# Patient Record
Sex: FEMALE | Race: WHITE | Employment: OTHER | URBAN - METROPOLITAN AREA
[De-identification: names, ages, dates, MRNs, and addresses within clinical notes are randomized per-mention and may not be internally consistent; named-entity substitution may affect disease eponyms.]

---

## 2017-01-16 DIAGNOSIS — R12 HEARTBURN: Primary | ICD-10-CM

## 2017-01-16 RX ORDER — NICOTINE POLACRILEX 4 MG/1
20 GUM, CHEWING ORAL DAILY
Qty: 90 TABLET | Refills: 1 | Status: SHIPPED | OUTPATIENT
Start: 2017-01-16 | End: 2017-07-23

## 2017-01-16 NOTE — TELEPHONE ENCOUNTER
Omeprazole      Last Written Prescription Date: 10/24/16  Last Fill Quantity: 90,  # refills: 0  Last Office Visit with G, P or [facility] prescribing provider:       12/23/16

## 2017-01-30 DIAGNOSIS — M25.552 HIP PAIN, LEFT: Primary | ICD-10-CM

## 2017-01-30 RX ORDER — MELOXICAM 7.5 MG/1
7.5 TABLET ORAL 2 TIMES DAILY
Qty: 60 TABLET | Refills: 0 | Status: SHIPPED | OUTPATIENT
Start: 2017-01-30 | End: 2017-03-23

## 2017-01-30 NOTE — TELEPHONE ENCOUNTER
Meloxicam      Last Written Prescription Date: 125/2016  Last Quantity: 60, # refills: 0  Last Office Visit with [facility], [facility] or [facility] prescribing provider: 12/23/2016       CREATININE   Date Value Ref Range Status   01/13/2016 0.72 0.52 - 1.04 mg/dL Final     AST       26   5/29/2012  ALT       28   12/16/2016  BP Readings from Last 3 Encounters:   12/23/16 132/80   11/08/16 138/82   07/15/16 120/72

## 2017-03-23 DIAGNOSIS — M25.552 HIP PAIN, LEFT: ICD-10-CM

## 2017-03-23 NOTE — TELEPHONE ENCOUNTER
meloxicum     Last Written Prescription Date: 01/30/2017  Last Quantity: 60, # refills: 0  Last Office Visit with [facility], [facility] or [facility] prescribing provider: 12/23/2016       Creatinine   Date Value Ref Range Status   01/13/2016 0.72 0.52 - 1.04 mg/dL Final     Lab Results   Component Value Date    AST 26 05/29/2012     Lab Results   Component Value Date    ALT 28 12/16/2016     BP Readings from Last 3 Encounters:   12/23/16 132/80   11/08/16 138/82   07/15/16 120/72

## 2017-03-24 DIAGNOSIS — E03.9 HYPOTHYROIDISM, UNSPECIFIED TYPE: ICD-10-CM

## 2017-03-24 DIAGNOSIS — Z11.59 NEED FOR HEPATITIS C SCREENING TEST: ICD-10-CM

## 2017-03-24 LAB
HCV AB SERPL QL IA: NORMAL
TSH SERPL DL<=0.05 MIU/L-ACNC: 2.34 MU/L (ref 0.4–4)

## 2017-03-24 PROCEDURE — 36415 COLL VENOUS BLD VENIPUNCTURE: CPT | Performed by: FAMILY MEDICINE

## 2017-03-24 PROCEDURE — 86803 HEPATITIS C AB TEST: CPT | Performed by: FAMILY MEDICINE

## 2017-03-24 PROCEDURE — 84443 ASSAY THYROID STIM HORMONE: CPT | Performed by: FAMILY MEDICINE

## 2017-03-24 RX ORDER — MELOXICAM 7.5 MG/1
7.5 TABLET ORAL 2 TIMES DAILY
Qty: 60 TABLET | Refills: 0 | Status: SHIPPED | OUTPATIENT
Start: 2017-03-24 | End: 2017-04-28

## 2017-03-24 RX ORDER — LEVOTHYROXINE SODIUM 150 UG/1
150 TABLET ORAL DAILY
Qty: 90 TABLET | Refills: 0 | Status: SHIPPED | OUTPATIENT
Start: 2017-03-24 | End: 2017-06-23

## 2017-04-03 NOTE — PROGRESS NOTES
Please call.  Hepatitis C test is negative.    TSH is normal indicating that thyroid function is normal.    PLAN: No change in current levothyroxine dose.

## 2017-04-28 DIAGNOSIS — M25.552 HIP PAIN, LEFT: ICD-10-CM

## 2017-04-28 RX ORDER — MELOXICAM 7.5 MG/1
TABLET ORAL
Qty: 60 TABLET | Refills: 0 | Status: SHIPPED | OUTPATIENT
Start: 2017-04-28 | End: 2017-07-16

## 2017-04-28 NOTE — TELEPHONE ENCOUNTER
Meloxicam 7.5      Last Written Prescription Date: 3/24/17  Last Quantity: 60, # refills: 0  Last [facility] Visit with [facility] or [facility] prescribing provider: 12/23/16       Creatinine   Date Value Ref Range Status   01/13/2016 0.72 0.52 - 1.04 mg/dL Final     Lab Results   Component Value Date    AST 26 05/29/2012     Lab Results   Component Value Date    ALT 28 12/16/2016     BP Readings from Last 3 Encounters:   12/23/16 132/80   11/08/16 138/82   07/15/16 120/72

## 2017-06-23 DIAGNOSIS — E03.9 HYPOTHYROIDISM, UNSPECIFIED TYPE: ICD-10-CM

## 2017-06-23 RX ORDER — LEVOTHYROXINE SODIUM 150 UG/1
TABLET ORAL
Qty: 90 TABLET | Refills: 2 | Status: SHIPPED | OUTPATIENT
Start: 2017-06-23 | End: 2018-03-21

## 2017-06-23 NOTE — TELEPHONE ENCOUNTER
Levothyroxine 150 mcg     Last Written Prescription Date: 3/24/17  Last Quantity: 90, # refills: 0  Last Office Visit with G, P or [facility] prescribing provider: 12/23/16        TSH   Date Value Ref Range Status   03/24/2017 2.34 0.40 - 4.00 mU/L Final

## 2017-07-16 DIAGNOSIS — M25.552 HIP PAIN, LEFT: ICD-10-CM

## 2017-07-17 RX ORDER — MELOXICAM 7.5 MG/1
TABLET ORAL
Qty: 60 TABLET | Refills: 0 | Status: SHIPPED | OUTPATIENT
Start: 2017-07-17 | End: 2017-07-25

## 2017-07-17 NOTE — TELEPHONE ENCOUNTER
Meloxicam      Last Written Prescription Date: 04/28/17  Last Quantity: 60, # refills: 0  Last Office Visit with [facility], [facility] or [facility] prescribing provider: 12/23/16       Creatinine   Date Value Ref Range Status   01/13/2016 0.72 0.52 - 1.04 mg/dL Final     Lab Results   Component Value Date    AST 26 05/29/2012     Lab Results   Component Value Date    ALT 28 12/16/2016     BP Readings from Last 3 Encounters:   12/23/16 132/80   11/08/16 138/82   07/15/16 120/72

## 2017-07-21 ENCOUNTER — TELEPHONE (OUTPATIENT)
Dept: FAMILY MEDICINE | Facility: CLINIC | Age: 67
End: 2017-07-21

## 2017-07-21 DIAGNOSIS — M25.552 HIP PAIN, LEFT: ICD-10-CM

## 2017-07-21 NOTE — TELEPHONE ENCOUNTER
Audelia is calling to see if Dr. Day will write Maloxicam 7.5 mg from # 60 to #30 ?   This is do to insurance coverage. Please call on her [phone].  [facility]

## 2017-07-21 NOTE — TELEPHONE ENCOUNTER
I talked with Audelia and she will check with the pharmacy about this medication, the dosing and amount ordered  Lorin Phelan RN

## 2017-07-23 DIAGNOSIS — R12 HEARTBURN: ICD-10-CM

## 2017-07-24 RX ORDER — NICOTINE POLACRILEX 4 MG/1
GUM, CHEWING ORAL
Qty: 90 TABLET | Refills: 0 | Status: SHIPPED | OUTPATIENT
Start: 2017-07-24 | End: 2017-11-03

## 2017-07-24 NOTE — TELEPHONE ENCOUNTER
Omeprazole  20 mg    Last Written Prescription Date: 1/16/17  Last Fill Quantity: 90,  # refills: 1   Last Office Visit with G, [facility] or [facility] prescribing provider: 12/23/16

## 2017-07-25 DIAGNOSIS — M25.552 HIP PAIN, LEFT: ICD-10-CM

## 2017-07-25 RX ORDER — MELOXICAM 7.5 MG/1
7.5 TABLET ORAL 2 TIMES DAILY
Qty: 60 TABLET | Refills: 0 | Status: SHIPPED | OUTPATIENT
Start: 2017-07-25 | End: 2017-11-10

## 2017-09-06 ENCOUNTER — TELEPHONE (OUTPATIENT)
Dept: FAMILY MEDICINE | Facility: CLINIC | Age: 67
End: 2017-09-06

## 2017-09-07 NOTE — TELEPHONE ENCOUNTER
Pharmacy requesting a prior auth on the meloxicam 7.5mg tabs 1BID qty 60 per 30 days.  [facility] wants them to use 1/2 of the 15mg tabs qty 30 per 30 days.  Pharmacy notified.

## 2017-11-03 DIAGNOSIS — R12 HEARTBURN: ICD-10-CM

## 2017-11-10 DIAGNOSIS — M25.552 HIP PAIN, LEFT: ICD-10-CM

## 2017-11-10 RX ORDER — MELOXICAM 15 MG/1
TABLET ORAL
Qty: 30 TABLET | Refills: 0 | Status: SHIPPED | OUTPATIENT
Start: 2017-11-10 | End: 2018-01-12

## 2017-11-21 ENCOUNTER — TELEPHONE (OUTPATIENT)
Dept: FAMILY MEDICINE | Facility: CLINIC | Age: 67
End: 2017-11-21

## 2017-12-07 ENCOUNTER — OFFICE VISIT (OUTPATIENT)
Dept: FAMILY MEDICINE | Facility: CLINIC | Age: 67
End: 2017-12-07
Payer: COMMERCIAL

## 2017-12-07 VITALS
RESPIRATION RATE: 14 BRPM | TEMPERATURE: 98.6 F | SYSTOLIC BLOOD PRESSURE: 136 MMHG | DIASTOLIC BLOOD PRESSURE: 86 MMHG | BODY MASS INDEX: 39.86 KG/M2 | HEIGHT: 66 IN | WEIGHT: 248 LBS | HEART RATE: 68 BPM

## 2017-12-07 DIAGNOSIS — Z23 NEED FOR PROPHYLACTIC VACCINATION AND INOCULATION AGAINST INFLUENZA: ICD-10-CM

## 2017-12-07 DIAGNOSIS — Z12.11 SPECIAL SCREENING FOR MALIGNANT NEOPLASMS, COLON: ICD-10-CM

## 2017-12-07 DIAGNOSIS — G57.10 MERALGIA PARESTHETICA, UNSPECIFIED LATERALITY: Primary | ICD-10-CM

## 2017-12-07 PROCEDURE — 90670 PCV13 VACCINE IM: CPT | Performed by: FAMILY MEDICINE

## 2017-12-07 PROCEDURE — 99213 OFFICE O/P EST LOW 20 MIN: CPT | Mod: 25 | Performed by: FAMILY MEDICINE

## 2017-12-07 PROCEDURE — G0008 ADMIN INFLUENZA VIRUS VAC: HCPCS | Performed by: FAMILY MEDICINE

## 2017-12-07 PROCEDURE — 90662 IIV NO PRSV INCREASED AG IM: CPT | Performed by: FAMILY MEDICINE

## 2017-12-07 PROCEDURE — G0009 ADMIN PNEUMOCOCCAL VACCINE: HCPCS | Performed by: FAMILY MEDICINE

## 2017-12-07 NOTE — NURSING NOTE
"Chief Complaint   Patient presents with     Musculoskeletal Problem       Initial /86  Pulse 68  Temp 98.6  F (37  C) (Tympanic)  Resp 14  Ht 5' 6\" (1.676 m)  Wt 248 lb (112.5 kg)  BMI 40.03 kg/m2 Estimated body mass index is 40.03 kg/(m^2) as calculated from the following:    Height as of this encounter: 5' 6\" (1.676 m).    Weight as of this encounter: 248 lb (112.5 kg).  Medication Reconciliation: complete    Health Maintenance that is potentially due pending provider review:          Is there anyone who you would like to be able to receive your results?   If yes have patient fill out LEANA    "

## 2017-12-07 NOTE — MR AVS SNAPSHOT
After Visit Summary   12/7/2017    Audelia Aceves    MRN: [id number]           Patient Information     Date Of Birth          1950        Visit Information        Provider Department      12/7/2017 2:40 PM Rajat Day MD [facility]        Today's Diagnoses     Meralgia paresthetica, unspecified laterality    -  1      Care Instructions    Your thigh discomfort is likely due to nerve pain. Weight loss and staying active can help. Wearing loose fitting pants and belts can also help reduce compression on this nerve.    If your pain does not improve or worsens, we can try a medication.    You received you flu shot and 1st dose of pneumococcal (pneumonia) vaccine today.    You can return to the pharmacy to receive your tetanus booster vaccine before your trip.    You are due for colon cancer screening and a mammogram.  - FIT test (stool sample) for colon cancer screening.            Follow-ups after your visit        Who to contact     If you have questions or need follow up information about today's clinic visit or your schedule please contact [facility] directly at [phone].  Normal or non-critical lab and imaging results will be communicated to you by [employer], letter or phone within 4 business days after the clinic has received the results. If you do not hear from us within 7 days, please contact the clinic through [employer] or phone. If you have a critical or abnormal lab result, we will notify you by phone as soon as possible.  Submit refill requests through [employer] or call your pharmacy and they will forward the refill request to us. Please allow 3 business days for your refill to be completed.          Additional Information About Your Visit        MyChart Information     [employer] lets you send messages to your doctor, view your test results, renew your prescriptions, schedule appointments and more. To sign up, go to [email or web address] .  "Click on \"Log in\" on the left side of the screen, which will take you to the Welcome page. Then click on \"Sign up Now\" on the right side of the page.     You will be asked to enter the access code listed below, as well as some personal information. Please follow the directions to create your username and password.     Your access code is: [email or web address]  Expires: 3/7/2018  3:42 PM     Your access code will  in 90 days. If you need help or a new code, please call your [place] clinic or [phone].        Care EveryWhere ID     This is your Care EveryWhere ID. This could be used by other organizations to access your [place] medical records  [id number]        Your Vitals Were     Pulse Temperature Respirations Height BMI (Body Mass Index)       68 98.6  F (37  C) (Tympanic) 14 5' 6\" (1.676 m) 40.03 kg/m2        Blood Pressure from Last 3 Encounters:   17 136/86   16 132/80   16 138/82    Weight from Last 3 Encounters:   17 248 lb (112.5 kg)   16 245 lb (111.1 kg)   16 244 lb (110.7 kg)              Today, you had the following     No orders found for display       Primary Care Provider Office Phone # Fax #    Rajat Day [phone] [address] [place], [place] [address]        Equal Access to Services     [facility] AH: Hadii praveen ku hadasho Soomaali, waaxda luqadaha, qaybta kaalmada adeegyada, roge edwards . So [facility] [phone].    ATENCIÓN: Si habla español, tiene a blancas disposición servicios gratuitos de asistencia lingüística. Llame al [phone].    We comply with applicable federal civil rights laws and [place] laws. We do not discriminate on the basis of race, color, national origin, age, disability, sex, sexual orientation, or gender identity.            Thank you!     Thank you for choosing [facility]  for your care. Our goal is always to provide you with excellent care. Hearing back from " our patients is one way we can continue to improve our services. Please take a few minutes to complete the written survey that you may receive in the mail after your visit with us. Thank you!             Your Updated Medication List - Protect others around you: Learn how to safely use, store and throw away your medicines at www.disposemymeds.org.          This list is accurate as of: 12/7/17  3:42 PM.  Always use your most recent med list.                   Brand Name Dispense Instructions for use Diagnosis    amLODIPine 10 MG tablet    NORVASC    90 tablet    Take 1 tablet (10 mg) by mouth daily    Hypertension goal BP (blood pressure) < 140/90       aspirin 81 MG tablet      Take by mouth daily        calcium 600-200 MG-UNIT Tabs     30 tablet    Take 1 tablet by mouth daily        Glucosamine HCl 1000 MG Tabs      Take 1 tablet by mouth 2 times daily.        levothyroxine 150 MCG tablet    SYNTHROID/LEVOTHROID    90 tablet    TAKE ONE TABLET BY MOUTH ONCE DAILY    Hypothyroidism, unspecified type       losartan 50 MG tablet    COZAAR    90 tablet    Take 1 tablet (50 mg) by mouth daily    Hypertension goal BP (blood pressure) < 140/90       lovastatin 40 MG tablet    MEVACOR    90 tablet    Take 1 tablet (40 mg) by mouth At Bedtime    Hyperlipidemia LDL goal <130       meloxicam 15 MG tablet    MOBIC    30 tablet    TAKE ONE-HALF TABLET BY MOUTH TWICE A DAY    Hip pain, left       nitroGLYcerin 0.4 MG sublingual tablet    NITROSTAT    25 tablet    Place 1 tablet (0.4 mg) under the tongue every 5 minutes as needed for chest pain    Chest pain, unspecified type       omeprazole 20 MG CR capsule    priLOSEC    90 capsule    TAKE ONE CAPSULE BY MOUTH ONCE DAILY 30-60 MINUTES BEFORE A MEAL    Heartburn       oxybutynin 5 MG tablet    DITROPAN    270 tablet    Take 1 tablet (5 mg) by mouth 3 times daily    Overactive bladder       vitamin D3 2000 UNITS Caps      Take 1 capsule by mouth

## 2017-12-07 NOTE — PATIENT INSTRUCTIONS
Your thigh discomfort is likely due to nerve pain. Weight loss and staying active can help. Wearing loose fitting pants and belts can also help reduce compression on this nerve.    If your pain does not improve or worsens, we can try a medication.    You received you flu shot and 1st dose of pneumococcal (pneumonia) vaccine today.    You can return to the pharmacy to receive your tetanus booster vaccine before your trip.    You are due for colon cancer screening and a mammogram.  - FIT test (stool sample) for colon cancer screening.

## 2017-12-07 NOTE — PROGRESS NOTES
SUBJECTIVE:   Audelia Aceves is a 67 year old female who presents to clinic today for the following health issues:    Thighs burning    Duration: months    Description (location/character/radiation): Top of thighs, burning,prickly and painful. Worse when standing.    Intensity:  mild, moderate    Accompanying signs and symptoms: Wakes her at night.    History (similar episodes/previous evaluation): None    Precipitating or alleviating factors: None    Therapies tried and outcome:      Ms. Aceves presents with a 2-month history of intermittent burning pain and numbness on anterior bilateral thighs. The pain occurs 3-4 times per day and lasts for about 10 minutes before spontaneously resolving. The pain is worse with standing for long periods of time and causes discomfort at night. She denies back pain, pain radiating to posterior thighs or groin area, no new skin rash, or urinary symptoms. Has not taken over the counter medications for this. No similar symptoms in the past or related trauma to the area.    Patient Active Problem List   Diagnosis     Hypertension goal BP (blood pressure) < 140/90     Obesity (BMI 30-39.9)     Hypothyroidism     Hyperlipidemia LDL goal <130     OA (osteoarthritis) of knee     Advanced directives, counseling/discussion     Chest pain      Problem list and histories reviewed & adjusted, as indicated.  Additional history: none  Labs reviewed in EPIC    Reviewed and updated as needed this visit by clinical staffAllergies       Reviewed and updated as needed this visit by Provider       ROS:  Constitutional: No fever, chills, weight loss, change in appetite, weakness  Respiratory: No shortness of breath  Cardiovascular: No chest pain, orthopnea  Gastrointestinal: No heartburn, abdominal pain  Genitourinary: No urinary urgency or change in frequency, incontinence, dysuria  Skin: No rashes, sores  Musculoskeletal: No arthralgias, deformity, swelling, myalgias, muscle  "weakness  Neurologic: See above. No headache, dizziness, focal weakness  Psychiatric: No changes in sleep, mood, interest, concentration    OBJECTIVE:     /86  Pulse 68  Temp 98.6  F (37  C) (Tympanic)  Resp 14  Ht 5' 6\" (1.676 m)  Wt 248 lb (112.5 kg)  BMI 40.03 kg/m2  Body mass index is 40.03 kg/(m^2).    Constitutional: obese, well appearing, in no acute distress  Head: normocephalic  Eyes: anicteric sclera  GI: soft, non-distended abdomen, non-tender to palpation, normoactive bowel sounds  : no hernia or inguinal lymphadenopathy  Skin: warm and dry, no rashes or erythema  Musculoskeletal: ROM intact of lower extremities, no joint erythema or tenderness, normal gait   Neuro: decreased touch sensation to monofilament to lateral and anterior aspect of bilateral thighs. Decreased sensation more pronounced on right. Lower extremity strength, reflexes, ROM intact and equal bilaterally. Alert and oriented to person, time, and place  Psych: appropriate mood and affect, cooperative with exam    Diagnostic Test Results:  none     ASSESSMENT/PLAN:     (G57.10) Meralgia paresthetica, unspecified laterality  (primary encounter diagnosis)  Comment: Burning, numbness in bilateral lateral anterior thighs fits the distribution of lateral femoral cutaneous nerve. Likely secondary to compression and increased weight.  Plan: Weight loss, staying active, reducing sitting time, wearing loose fitting pants and belts to reduce nerve compression. She declined prescription for neuropathic pain today, but understands this can be prescribed in the future if her pain worsens.    Health maintenance: Flu vaccine, 1st dost of pneumococcal vaccine administered today. FIT test for colonoscopy screening provided.    See Patient Instructions    Ej Yang, MS3  I did see and examine patient with Ej Yang today.   GENA PERSAUD MD   "

## 2017-12-15 DIAGNOSIS — I10 HYPERTENSION GOAL BP (BLOOD PRESSURE) < 140/90: ICD-10-CM

## 2017-12-15 RX ORDER — AMLODIPINE BESYLATE 10 MG/1
TABLET ORAL
Qty: 90 TABLET | Refills: 3 | Status: SHIPPED | OUTPATIENT
Start: 2017-12-15 | End: 2018-12-20

## 2018-01-12 DIAGNOSIS — E78.5 HYPERLIPIDEMIA LDL GOAL <130: Primary | ICD-10-CM

## 2018-01-12 DIAGNOSIS — M25.552 HIP PAIN, LEFT: ICD-10-CM

## 2018-01-13 NOTE — TELEPHONE ENCOUNTER
"Requested Prescriptions   Pending Prescriptions Disp Refills     meloxicam (MOBIC) 15 MG tablet   Last Written Prescription Date:  11/10/17  Last Fill Quantity: 30,  # refills: 0   Last Office Visit with [facility], [facility] or [facility] prescribing provider:  12/7/17   Future Office Visit:      30 tablet 0     Sig: TAKE 1/2 TABLET BY MOUTH TWO TIMES A DAY    NSAID Medications Failed    1/12/2018 10:33 PM       Failed - Normal ALT on file in past 12 months    Recent Labs   Lab Test  12/16/16   1100   ALT  28            Failed - Normal AST on file in past 12 months    Recent Labs   Lab Test  05/29/12   1510   AST  26            Failed - Patient is age 6-64 years       Failed - Normal CBC on file in past 12 months    Recent Labs   Lab Test  05/29/12   1510   WBC  8.1   RBC  4.88   HGB  14.4   HCT  43.4   PLT  279            Failed - Normal serum creatinine on file in past 12 months    Recent Labs   Lab Test  01/13/16   1624   CR  0.72            Passed - Blood pressure under 140/90    BP Readings from Last 3 Encounters:   12/07/17 136/86   12/23/16 132/80   11/08/16 138/82                Passed - Recent or future visit with authorizing provider's specialty    Patient had office visit in the last year or has a visit in the next 30 days with authorizing provider.  See \"Patient Info\" tab in inbasket, or \"Choose Columns\" in Meds & Orders section of the refill encounter.              Passed - No active pregnancy on record       Passed - No positive pregnancy test in past 12 months          "

## 2018-01-15 RX ORDER — MELOXICAM 15 MG/1
TABLET ORAL
Qty: 30 TABLET | Refills: 0 | Status: SHIPPED | OUTPATIENT
Start: 2018-01-15 | End: 2018-03-09

## 2018-02-03 DIAGNOSIS — E78.5 HYPERLIPIDEMIA LDL GOAL <130: ICD-10-CM

## 2018-02-03 DIAGNOSIS — R12 HEARTBURN: ICD-10-CM

## 2018-02-04 NOTE — TELEPHONE ENCOUNTER
"Requested Prescriptions   Pending Prescriptions Disp Refills     lovastatin (MEVACOR) 40 MG tablet   Last Written Prescription Date:  12/23/16  Last Fill Quantity: 90,  # refills: 3   Last Office Visit with [facility] provider:  12/7/17   Future Office Visit:    90 tablet 3     Sig: TAKE ONE TABLET BY MOUTH AT BEDTIME    Statins Protocol Failed    2/3/2018  7:09 PM       Failed - LDL on file in past 12 months    Recent Labs   Lab Test  12/16/16   1100   LDL  102*            Passed - No abnormal creatine kinase in past 12 months    No lab results found.         Passed - Recent or future visit with authorizing provider    Patient had office visit in the last year or has a visit in the next 30 days with authorizing provider.  See \"Patient Info\" tab in inbasket, or \"Choose Columns\" in Meds & Orders section of the refill encounter.            Passed - Patient is age 18 or older       Passed - No active pregnancy on record       Passed - No positive pregnancy test in past 12 months        omeprazole (PRILOSEC) 20 MG CR capsule  Last Written Prescription Date:  11/3/17  Last Fill Quantity: 90,  # refills: 0   Last Office Visit with [facility] provider:  12/7/17   Future Office Visit:      90 capsule 0     Sig: TAKE ONE CAPSULE BY MOUTH ONCE DAILY 30-60 MINUTES BEFORE A MEAL    PPI Protocol Passed    2/3/2018  7:09 PM       Passed - Not on Clopidogrel (unless Pantoprazole ordered)       Passed - No diagnosis of osteoporosis on record       Passed - Recent or future visit with authorizing provider's specialty    Patient had office visit in the last year or has a visit in the next 30 days with authorizing provider.  See \"Patient Info\" tab in inbasket, or \"Choose Columns\" in Meds & Orders section of the refill encounter.            Passed - Patient is age 18 or older       Passed - No active pregnacy on record       Passed - No positive pregnancy test in past 12 months          "

## 2018-02-05 RX ORDER — LOVASTATIN 40 MG
TABLET ORAL
Qty: 90 TABLET | Refills: 0 | Status: SHIPPED | OUTPATIENT
Start: 2018-02-05 | End: 2018-05-02

## 2018-02-15 DIAGNOSIS — I10 HYPERTENSION GOAL BP (BLOOD PRESSURE) < 140/90: ICD-10-CM

## 2018-02-16 RX ORDER — LOSARTAN POTASSIUM 50 MG/1
TABLET ORAL
Qty: 90 TABLET | Refills: 0 | Status: SHIPPED | OUTPATIENT
Start: 2018-02-16 | End: 2018-03-22

## 2018-02-16 NOTE — TELEPHONE ENCOUNTER
"Requested Prescriptions   Pending Prescriptions Disp Refills     losartan (COZAAR) 50 MG tablet [Pharmacy Med Name: LOSARTAN POTASSIUM 50MG TABS] 90 tablet 3     Sig: TAKE ONE TABLET BY MOUTH ONCE DAILY    Angiotensin-II Receptors Failed    2/15/2018  8:58 PM       Failed - Normal serum creatinine on file in past 12 months    Recent Labs   Lab Test  01/13/16   1624   CR  0.72            Failed - Normal serum potassium on file in past 12 months    Recent Labs   Lab Test  01/13/16   1624   POTASSIUM  3.8                   Passed - Blood pressure under 140/90 in past 12 months    BP Readings from Last 3 Encounters:   12/07/17 136/86   12/23/16 132/80   11/08/16 138/82                Passed - Recent or future visit with authorizing provider's specialty    Patient had office visit in the last year or has a visit in the next 30 days with authorizing provider.  See \"Patient Info\" tab in inbasket, or \"Choose Columns\" in Meds & Orders section of the refill encounter.            Passed - Patient is age 18 or older       Passed - No active pregnancy on record       Passed - No positive pregnancy test in past 12 months        losartan (COZAAR) 50 MG tablet  Last Written Prescription Date:  12/23/2016  Last Fill Quantity: 90 tablet,  # refills: 3   Last office visit: 12/7/2017 with prescribing provider:  12/07/2017 NILSA Augustine   Future Office Visit:      Abril WEIR (R) (M)      "

## 2018-02-23 DIAGNOSIS — E78.5 HYPERLIPIDEMIA LDL GOAL <130: ICD-10-CM

## 2018-02-23 DIAGNOSIS — M25.552 HIP PAIN, LEFT: ICD-10-CM

## 2018-02-23 LAB
ALBUMIN SERPL-MCNC: 3.6 G/DL (ref 3.4–5)
ALP SERPL-CCNC: 113 U/L (ref 40–150)
ALT SERPL W P-5'-P-CCNC: 25 U/L (ref 0–50)
ANION GAP SERPL CALCULATED.3IONS-SCNC: 5 MMOL/L (ref 3–14)
AST SERPL W P-5'-P-CCNC: 19 U/L (ref 0–45)
BILIRUB SERPL-MCNC: 0.4 MG/DL (ref 0.2–1.3)
BUN SERPL-MCNC: 16 MG/DL (ref 7–30)
CALCIUM SERPL-MCNC: 8.8 MG/DL (ref 8.5–10.1)
CHLORIDE SERPL-SCNC: 106 MMOL/L (ref 94–109)
CHOLEST SERPL-MCNC: 168 MG/DL
CO2 SERPL-SCNC: 28 MMOL/L (ref 20–32)
CREAT SERPL-MCNC: 0.52 MG/DL (ref 0.52–1.04)
ERYTHROCYTE [DISTWIDTH] IN BLOOD BY AUTOMATED COUNT: 14.4 % (ref 10–15)
GFR SERPL CREATININE-BSD FRML MDRD: >90 ML/MIN/1.7M2
GLUCOSE SERPL-MCNC: 113 MG/DL (ref 70–99)
HCT VFR BLD AUTO: 44 % (ref 35–47)
HDLC SERPL-MCNC: 53 MG/DL
HGB BLD-MCNC: 14.1 G/DL (ref 11.7–15.7)
LDLC SERPL CALC-MCNC: 92 MG/DL
MCH RBC QN AUTO: 28.5 PG (ref 26.5–33)
MCHC RBC AUTO-ENTMCNC: 32 G/DL (ref 31.5–36.5)
MCV RBC AUTO: 89 FL (ref 78–100)
NONHDLC SERPL-MCNC: 115 MG/DL
PLATELET # BLD AUTO: 280 10E9/L (ref 150–450)
POTASSIUM SERPL-SCNC: 3.6 MMOL/L (ref 3.4–5.3)
PROT SERPL-MCNC: 7.5 G/DL (ref 6.8–8.8)
RBC # BLD AUTO: 4.94 10E12/L (ref 3.8–5.2)
SODIUM SERPL-SCNC: 139 MMOL/L (ref 133–144)
TRIGL SERPL-MCNC: 113 MG/DL
WBC # BLD AUTO: 7.5 10E9/L (ref 4–11)

## 2018-02-23 PROCEDURE — 80061 LIPID PANEL: CPT | Performed by: FAMILY MEDICINE

## 2018-02-23 PROCEDURE — 85027 COMPLETE CBC AUTOMATED: CPT | Performed by: FAMILY MEDICINE

## 2018-02-23 PROCEDURE — 36415 COLL VENOUS BLD VENIPUNCTURE: CPT | Performed by: FAMILY MEDICINE

## 2018-02-23 PROCEDURE — 80053 COMPREHEN METABOLIC PANEL: CPT | Performed by: FAMILY MEDICINE

## 2018-02-28 ENCOUNTER — RADIANT APPOINTMENT (OUTPATIENT)
Dept: GENERAL RADIOLOGY | Facility: CLINIC | Age: 68
End: 2018-02-28
Attending: NURSE PRACTITIONER
Payer: OTHER MISCELLANEOUS

## 2018-02-28 ENCOUNTER — OFFICE VISIT (OUTPATIENT)
Dept: FAMILY MEDICINE | Facility: CLINIC | Age: 68
End: 2018-02-28
Payer: OTHER MISCELLANEOUS

## 2018-02-28 VITALS
DIASTOLIC BLOOD PRESSURE: 90 MMHG | BODY MASS INDEX: 40.02 KG/M2 | SYSTOLIC BLOOD PRESSURE: 144 MMHG | HEIGHT: 66 IN | WEIGHT: 249 LBS | RESPIRATION RATE: 18 BRPM | HEART RATE: 72 BPM | TEMPERATURE: 98.8 F

## 2018-02-28 DIAGNOSIS — S86.912A KNEE STRAIN, LEFT, INITIAL ENCOUNTER: Primary | ICD-10-CM

## 2018-02-28 DIAGNOSIS — S86.912A KNEE STRAIN, LEFT, INITIAL ENCOUNTER: ICD-10-CM

## 2018-02-28 PROCEDURE — 99213 OFFICE O/P EST LOW 20 MIN: CPT | Performed by: NURSE PRACTITIONER

## 2018-02-28 PROCEDURE — 73560 X-RAY EXAM OF KNEE 1 OR 2: CPT | Mod: LT

## 2018-02-28 ASSESSMENT — PAIN SCALES - GENERAL: PAINLEVEL: SEVERE PAIN (7)

## 2018-02-28 NOTE — PATIENT INSTRUCTIONS
RICE advice    Tylenol and/or Ibuprofen for comfort.    Set up physical therapy appointment at any clinic of your choice.       Knee Sprain    A sprain is an injury to the ligaments or capsule that holds a joint together. There are no broken bones. Most sprains take 3 to 6 weeks to heal. If it a severe sprain where the ligament is completely torn, it can take months to recover.  Most knee sprains are treated with a splint, knee immobilizer brace, or elastic wrap for support. Severe sprains may require surgery.  Home care    Stay off the injured leg as much as possible until you can walk on it without pain. If you have a lot of pain with walking, crutches or a walker may be prescribed. (These can be rented or purchased at many pharmacies and surgical or orthopedic supply stores). Follow your healthcare provider's advice about when to begin putting weight on that leg.    Keep your leg elevated to reduce pain and swelling. When sleeping, place a pillow under the injured leg. When sitting, support the injured leg so it is level with your waist. This is very important during the first 48 hours.    Apply an ice pack over the injured area for 15 to 20 minutes every 3 to 6 hours. You should do this for the first 24 to 48 hours. You can make an ice pack by filling a plastic bag that seals at the top with ice cubes and then wrapping it with a thin towel. Continue to use ice packs for relief of pain and swelling as needed. As the ice melts, be careful to avoid getting your wrap, splint, or cast wet. After 48 hours, apply heat (warm shower or warm bath) for 15 to 20 minutes several times a day, or alternate ice and heat. You can place the ice pack directly over the splint. If you have to wear a hook-and-loop knee brace, you can open it to apply the ice pack, or heat, directly to the knee. Never put ice directly on the skin. Always wrap the ice in a towel or other type of cloth.    You may use over-the-counter pain medicine to  control pain, unless another pain medicine was prescribed.If you have chronic liver or kidney disease or ever had a stomach ulcer or GI bleeding, talk with your healthcare provider before using these medicines.    If you were given a splint, keep it completely dry at all times. Bathe with your splint out of the water, protected with 2 large plastic bags, rubber-banded at the top end. If a fiberglass splint gets wet, you can dry it with a hair dryer. If you have a hook-and-loop knee brace, you can remove this to bathe, unless told otherwise.  Follow-up care  Follow up with your doctor as advised. Any X-rays you had today don t show any broken bones, breaks, or fractures. Sometimes fractures don t show up on the first X-ray. Bruises and sprains can sometimes hurt as much as a fracture. These injuries can take time to heal completely. If your symptoms don t improve or they get worse, talk with your doctor. You may need a repeat X-ray. If X-rays were taken, you will be told of any new findings that may affect your care.  Call 911  Call 911 if you have:     Shortness of breath     Chest pain  When to seek medical advice  Call your healthcare provider right away if any of these occur:    The splint or knee immobilizer brace becomes wet or soft    The fiberglass cast or splint remains wet for more than 24 hours    Pain or swelling increases    The injured leg or toes become cold, blue, numb, or tingly  Date Last Reviewed: 11/20/2015 2000-2017 [employer]. [address] [place] [address], [place] [address]. All rights reserved. This information is not intended as a substitute for professional medical care. Always follow your healthcare professional's instructions.        RICE     Rest an injury, elevate it, and use ice and compression as directed.   RICE stands for rest, ice, compression, and elevation. These can limit pain and swelling after an injury. RICE may be recommended to help treat fractures, sprains,  strains, and bruises or bumps.   Home care  The following explain the details of RICE:    Rest. Limit the use of the injured body part. This helps prevent further damage to the body part and gives it time to heal. In some cases, you may need a sling, brace, splint, or cast to help keep the body part still until it has healed.    Ice. Applying ice right after an injury helps relieve pain and swelling. Wrap a bag of ice in a thin towel. Then, place it over the injured area. Do this for 10 to 15 minutes every 3 to 4 hours. Continue for the next 1 to 3 days or until your symptoms improve. Never put ice directly on your skin or ice an area longer than 15 minutes at a time.    Compression. Putting pressure on an injury helps reduce swelling and provides support. Wrap the injured area firmly with an elastic bandage/wrap. Make sure not to wrap the bandage too tightly or you will cut off blood flow to the injured area. If your bandage loosens, rewrap it.    Elevation. Keeping an injury raised above the level of your heart reduces swelling, pain, and throbbing. For instance, if you have a broken leg, it may help to rest your leg on several pillows when sitting or lying down. Try to keep the injured area elevated for at least 2 to 3 hours per day.  Follow-up care  Follow up with your healthcare provider, or as advised.  When to seek medical advice  Call your healthcare provider right away if any of these occur:    Fever of 100.4 F (38 C) or higher, or as directed by your healthcare provider    Increased pain or swelling in the injured body part    Injured body part becomes cold, blue, numb, or tingly    Signs of infection. These include warmth in the skin, redness, drainage, or bad smell coming from the injured body part.  Date Last Reviewed: 1/18/2016 2000-2017 [employer]. [address] [place] [address], [place] [address]. All rights reserved. This information is not intended as a substitute for professional medical  care. Always follow your healthcare professional's instructions.

## 2018-02-28 NOTE — MR AVS SNAPSHOT
After Visit Summary   2/28/2018    Audelia Aceves    MRN: [id number]           Patient Information     Date Of Birth          1950        Visit Information        Provider Department      2/28/2018 12:00 PM Jeannine Reece APRN [facility]        Today's Diagnoses     Knee strain, left, initial encounter    -  1      Care Instructions    RICE advice    Tylenol and/or Ibuprofen for comfort.    Set up physical therapy appointment at any clinic of your choice.       Knee Sprain    A sprain is an injury to the ligaments or capsule that holds a joint together. There are no broken bones. Most sprains take 3 to 6 weeks to heal. If it a severe sprain where the ligament is completely torn, it can take months to recover.  Most knee sprains are treated with a splint, knee immobilizer brace, or elastic wrap for support. Severe sprains may require surgery.  Home care    Stay off the injured leg as much as possible until you can walk on it without pain. If you have a lot of pain with walking, crutches or a walker may be prescribed. (These can be rented or purchased at many pharmacies and surgical or orthopedic supply stores). Follow your healthcare provider's advice about when to begin putting weight on that leg.    Keep your leg elevated to reduce pain and swelling. When sleeping, place a pillow under the injured leg. When sitting, support the injured leg so it is level with your waist. This is very important during the first 48 hours.    Apply an ice pack over the injured area for 15 to 20 minutes every 3 to 6 hours. You should do this for the first 24 to 48 hours. You can make an ice pack by filling a plastic bag that seals at the top with ice cubes and then wrapping it with a thin towel. Continue to use ice packs for relief of pain and swelling as needed. As the ice melts, be careful to avoid getting your wrap, splint, or cast wet. After 48 hours, apply heat (warm shower  or warm bath) for 15 to 20 minutes several times a day, or alternate ice and heat. You can place the ice pack directly over the splint. If you have to wear a hook-and-loop knee brace, you can open it to apply the ice pack, or heat, directly to the knee. Never put ice directly on the skin. Always wrap the ice in a towel or other type of cloth.    You may use over-the-counter pain medicine to control pain, unless another pain medicine was prescribed.If you have chronic liver or kidney disease or ever had a stomach ulcer or GI bleeding, talk with your healthcare provider before using these medicines.    If you were given a splint, keep it completely dry at all times. Bathe with your splint out of the water, protected with 2 large plastic bags, rubber-banded at the top end. If a fiberglass splint gets wet, you can dry it with a hair dryer. If you have a hook-and-loop knee brace, you can remove this to bathe, unless told otherwise.  Follow-up care  Follow up with your doctor as advised. Any X-rays you had today don t show any broken bones, breaks, or fractures. Sometimes fractures don t show up on the first X-ray. Bruises and sprains can sometimes hurt as much as a fracture. These injuries can take time to heal completely. If your symptoms don t improve or they get worse, talk with your doctor. You may need a repeat X-ray. If X-rays were taken, you will be told of any new findings that may affect your care.  Call 911  Call 911 if you have:     Shortness of breath     Chest pain  When to seek medical advice  Call your healthcare provider right away if any of these occur:    The splint or knee immobilizer brace becomes wet or soft    The fiberglass cast or splint remains wet for more than 24 hours    Pain or swelling increases    The injured leg or toes become cold, blue, numb, or tingly  Date Last Reviewed: 11/20/2015 2000-2017 [employer]. [address] [place] [address]. All rights reserved.  This information is not intended as a substitute for professional medical care. Always follow your healthcare professional's instructions.        RICE     Rest an injury, elevate it, and use ice and compression as directed.   RICE stands for rest, ice, compression, and elevation. These can limit pain and swelling after an injury. RICE may be recommended to help treat fractures, sprains, strains, and bruises or bumps.   Home care  The following explain the details of RICE:    Rest. Limit the use of the injured body part. This helps prevent further damage to the body part and gives it time to heal. In some cases, you may need a sling, brace, splint, or cast to help keep the body part still until it has healed.    Ice. Applying ice right after an injury helps relieve pain and swelling. Wrap a bag of ice in a thin towel. Then, place it over the injured area. Do this for 10 to 15 minutes every 3 to 4 hours. Continue for the next 1 to 3 days or until your symptoms improve. Never put ice directly on your skin or ice an area longer than 15 minutes at a time.    Compression. Putting pressure on an injury helps reduce swelling and provides support. Wrap the injured area firmly with an elastic bandage/wrap. Make sure not to wrap the bandage too tightly or you will cut off blood flow to the injured area. If your bandage loosens, rewrap it.    Elevation. Keeping an injury raised above the level of your heart reduces swelling, pain, and throbbing. For instance, if you have a broken leg, it may help to rest your leg on several pillows when sitting or lying down. Try to keep the injured area elevated for at least 2 to 3 hours per day.  Follow-up care  Follow up with your healthcare provider, or as advised.  When to seek medical advice  Call your healthcare provider right away if any of these occur:    Fever of 100.4 F (38 C) or higher, or as directed by your healthcare provider    Increased pain or swelling in the injured body  "part    Injured body part becomes cold, blue, numb, or tingly    Signs of infection. These include warmth in the skin, redness, drainage, or bad smell coming from the injured body part.  Date Last Reviewed: 1/18/2016 2000-2017 [employer]. [address] [place] [address]. All rights reserved. This information is not intended as a substitute for professional medical care. Always follow your healthcare professional's instructions.                Follow-ups after your visit        Additional Services     PHYSICAL THERAPY REFERRAL       *This therapy referral will be filtered to a centralized scheduling office at [facility] and the patient will receive a call to schedule an appointment at a [facility] location most convenient for them. *     [facility] provides Physical Therapy evaluation and treatment and many specialty services across the [facility] system.  If requesting a specialty program, please choose from the list below.    If you have not heard from the scheduling office within 2 business days, please call [phone] for all locations, with the exception of [place], please call [phone] and [facility], please call [phone]  Treatment: Evaluation & Treatment  Special Instructions/Modalities: evaluate and treat left knee strain  Special Programs: None    Please be aware that coverage of these services is subject to the terms and limitations of your health insurance plan.  Call member services at your health plan with any benefit or coverage questions.      **Note to Provider:  If you are referring outside of [facility] for the therapy appointment, please list the name of the location in the \"special instructions\" above, print the referral and give to the patient to schedule the appointment.                  Follow-up notes from your care team     See patient instructions section of the AVS Return if symptoms worsen or fail to improve.    " "  Who to contact     If you have questions or need follow up information about today's clinic visit or your schedule please contact [facility] directly at [phone].  Normal or non-critical lab and imaging results will be communicated to you by MyChart, letter or phone within 4 business days after the clinic has received the results. If you do not hear from us within 7 days, please contact the clinic through MyChart or phone. If you have a critical or abnormal lab result, we will notify you by phone as soon as possible.  Submit refill requests through [facility] or call your pharmacy and they will forward the refill request to us. Please allow 3 business days for your refill to be completed.          Additional Information About Your Visit        Care EveryWhere ID     This is your Care EveryWhere ID. This could be used by other organizations to access your [facility] medical records  [id number]        Your Vitals Were     Pulse Temperature Respirations Height Breastfeeding? BMI (Body Mass Index)    72 98.8  F (37.1  C) (Tympanic) 18 5' 6\" (1.676 m) No 40.19 kg/m2       Blood Pressure from Last 3 Encounters:   02/28/18 144/90   12/07/17 136/86   12/23/16 132/80    Weight from Last 3 Encounters:   02/28/18 249 lb (112.9 kg)   12/07/17 248 lb (112.5 kg)   12/23/16 245 lb (111.1 kg)              We Performed the Following     PHYSICAL THERAPY REFERRAL          Today's Medication Changes          These changes are accurate as of 2/28/18  1:16 PM.  If you have any questions, ask your nurse or doctor.               Start taking these medicines.        Dose/Directions    order for DME   Used for:  Knee strain, left, initial encounter   Started by:  Jeannine Reece APRN CNP        Equipment being ordered: left knee brace   Quantity:  1 Device   Refills:  0            Where to get your medicines      Some of these will need a paper prescription and others can be bought over the counter.  Ask your " nurse if you have questions.     Bring a paper prescription for each of these medications     order for DME                Primary Care Provider Office Phone # Fax #    Rajat Day [phone] [address] [place] [address]        Equal Access to Services     CIARRA VANG : Hadii praveen gordon haddavo Sorafiqali, waaxda luqadaha, qaybta kaalmada adeegyada, roge cameron ebkeshia truong natalyajian siu. So [facility] [phone].    ATENCIÓN: Si habla español, tiene a blancas disposición servicios gratuitos de asistencia lingüística. Llame al [phone].    We comply with applicable federal civil rights laws and [place] laws. We do not discriminate on the basis of race, color, national origin, age, disability, sex, sexual orientation, or gender identity.            Thank you!     Thank you for choosing [facility]  for your care. Our goal is always to provide you with excellent care. Hearing back from our patients is one way we can continue to improve our services. Please take a few minutes to complete the written survey that you may receive in the mail after your visit with us. Thank you!             Your Updated Medication List - Protect others around you: Learn how to safely use, store and throw away your medicines at www.disposemymeds.org.          This list is accurate as of 2/28/18  1:16 PM.  Always use your most recent med list.                   Brand Name Dispense Instructions for use Diagnosis    amLODIPine 10 MG tablet    NORVASC    90 tablet    TAKE ONE TABLET BY MOUTH ONCE DAILY    Hypertension goal BP (blood pressure) < 140/90       aspirin 81 MG tablet      Take by mouth daily        calcium 600-200 MG-UNIT Tabs     90 tablet    Take 1 tablet by mouth daily    Hypertension goal BP (blood pressure) < 140/90       Glucosamine HCl 1000 MG Tabs      Take 1 tablet by mouth 2 times daily.        levothyroxine 150 MCG tablet    SYNTHROID/LEVOTHROID    90 tablet    TAKE ONE  TABLET BY MOUTH ONCE DAILY    Hypothyroidism, unspecified type       losartan 50 MG tablet    COZAAR    90 tablet    TAKE ONE TABLET BY MOUTH ONCE DAILY    Hypertension goal BP (blood pressure) < 140/90       lovastatin 40 MG tablet    MEVACOR    90 tablet    TAKE ONE TABLET BY MOUTH AT BEDTIME    Hyperlipidemia LDL goal <130       meloxicam 15 MG tablet    MOBIC    30 tablet    TAKE 1/2 TABLET BY MOUTH TWO TIMES A DAY    Hip pain, left       nitroGLYcerin 0.4 MG sublingual tablet    NITROSTAT    25 tablet    Place 1 tablet (0.4 mg) under the tongue every 5 minutes as needed for chest pain    Chest pain, unspecified type       omeprazole 20 MG CR capsule    priLOSEC    90 capsule    TAKE ONE CAPSULE BY MOUTH ONCE DAILY 30-60 MINUTES BEFORE A MEAL    Heartburn       order for DME     1 Device    Equipment being ordered: left knee brace    Knee strain, left, initial encounter       oxybutynin 5 MG tablet    DITROPAN    270 tablet    Take 1 tablet (5 mg) by mouth 3 times daily    Overactive bladder       vitamin D3 2000 UNITS Caps      Take 1 capsule by mouth

## 2018-02-28 NOTE — PROGRESS NOTES
SUBJECTIVE:   Audelia Aceves is a 67 year old female who presents to clinic today for the following health issues:      Musculoskeletal problem/pain      Duration: tripped yesterday over a floor grate. Pulled left knee    Description  Location: left knee    Intensity:  severe, 7/10    Accompanying signs and symptoms: radiation of pain to left thigh and swelling    History  Previous similar problem: YES- hx of meniscus repair 2009  Previous evaluation:  none    Precipitating or alleviating factors:  Trauma or overuse: YES-   Aggravating factors include: standing, walking, climbing stairs and overuse    Therapies tried and outcome: rest/inactivity, ice and elevation      Problem list and histories reviewed & adjusted, as indicated.  Additional history:     Patient Active Problem List   Diagnosis     Hypertension goal BP (blood pressure) < 140/90     Obesity (BMI 30-39.9)     Hypothyroidism     Hyperlipidemia LDL goal <130     OA (osteoarthritis) of knee     Advanced directives, counseling/discussion     Chest pain     Past Surgical History:   Procedure Laterality Date     ARTHROPLASTY TOE(S)  5/27/2011    Procedure:ARTHROPLASTY TOE(S); Subtalar implant-[facility] Procedure; Surgeon:LUIS DANIEL GRIDER; Location:[facility]     ARTHROSCOPY KNEE RT/LT  6-    left knee meniscal tear     COLONOSCOPY  10/13/06    1 pvujo-blshnh-pkchac in 10 yrs     LENGTHEN TENDON ACHILLES  5/27/2011    Procedure:LENGTHEN TENDON ACHILLES; Tendon transfer-Anes. consult-block       SURGICAL HISTORY OF -   1982    Tubal ligation     SURGICAL HISTORY OF -       uterine polyp removal     SURGICAL HISTORY OF -       left foot surgery for bunionette       Social History   Substance Use Topics     Smoking status: Never Smoker     Smokeless tobacco: Never Used     Alcohol use Yes      Comment: occ     Family History   Problem Relation Age of Onset     Hypertension Mother      Lipids Mother      DIABETES Mother      Myocardial Infarction  Mother      Hypertension Brother      Blood Disease Daughter      neutropenia     Thyroid Disease Sister      Musculoskeletal Disorder Sister      fibromyalia     GASTROINTESTINAL DISEASE Son      ibs     Depression Daughter      Breast Cancer No family hx of      Colon Cancer No family hx of          Current Outpatient Prescriptions   Medication Sig Dispense Refill     order for DME Equipment being ordered: left knee brace 1 Device 0     losartan (COZAAR) 50 MG tablet TAKE ONE TABLET BY MOUTH ONCE DAILY 90 tablet 0     calcium 600-200 MG-UNIT TABS Take 1 tablet by mouth daily 90 tablet 1     lovastatin (MEVACOR) 40 MG tablet TAKE ONE TABLET BY MOUTH AT BEDTIME 90 tablet 0     omeprazole (PRILOSEC) 20 MG CR capsule TAKE ONE CAPSULE BY MOUTH ONCE DAILY 30-60 MINUTES BEFORE A MEAL 90 capsule 0     meloxicam (MOBIC) 15 MG tablet TAKE 1/2 TABLET BY MOUTH TWO TIMES A DAY 30 tablet 0     amLODIPine (NORVASC) 10 MG tablet TAKE ONE TABLET BY MOUTH ONCE DAILY 90 tablet 3     levothyroxine (SYNTHROID/LEVOTHROID) 150 MCG tablet TAKE ONE TABLET BY MOUTH ONCE DAILY 90 tablet 2     nitroglycerin (NITROSTAT) 0.4 MG sublingual tablet Place 1 tablet (0.4 mg) under the tongue every 5 minutes as needed for chest pain 25 tablet 1     oxybutynin (DITROPAN) 5 MG tablet Take 1 tablet (5 mg) by mouth 3 times daily 270 tablet 3     aspirin 81 MG tablet Take by mouth daily       Cholecalciferol (VITAMIN D3) 2000 UNITS CAPS Take 1 capsule by mouth       Glucosamine HCl 1000 MG TABS Take 1 tablet by mouth 2 times daily.       Allergies   Allergen Reactions     Lisinopril Cough     Sulfa Drugs Rash     Labs reviewed in EPIC    Reviewed and updated as needed this visit by clinical staff  Tobacco  Allergies  Meds  Problems  Med Hx  Surg Hx  Fam Hx  Soc Hx        Reviewed and updated as needed this visit by Provider  Allergies  Meds  Problems         ROS:  Constitutional, HEENT, cardiovascular, pulmonary, GI, , musculoskeletal, neuro,  "skin, endocrine and psych systems are negative, except as otherwise noted.    OBJECTIVE:     /90 (BP Location: Right arm, Patient Position: Chair, Cuff Size: Adult Large)  Pulse 72  Temp 98.8  F (37.1  C) (Tympanic)  Resp 18  Ht 5' 6\" (1.676 m)  Wt 249 lb (112.9 kg)  Breastfeeding? No  BMI 40.19 kg/m2  Body mass index is 40.19 kg/(m^2).   GENERAL: healthy, alert and no distress, nontoxic in appearance  EYES: Eyes grossly normal to inspection, PERRL and conjunctivae and sclerae normal  HENT: normocephalic and atraumatic  NECK: supple with full ROM  MS: left knee mildly swollen just below and medial to patella. Grinding palpated with flexion and extension on lateral aspect of knee. No redness. Integument intact. She can ambulate.    Diagnostic Test Results:XR KNEE LT 1/2 VW 2/28/2018 12:42 PM     COMPARISON: 5/21/2009     HISTORY: Left knee strain.         IMPRESSION: Tricompartmental left knee osteophytosis without  significant joint space loss. No fractures are seen. No significant  soft tissue swelling or knee effusion.     ARIS HAMILTON MD  No results found for this or any previous visit (from the past 24 hour(s)).    ASSESSMENT/PLAN:     Problem List Items Addressed This Visit     None      Visit Diagnoses     Knee strain, left, initial encounter    -  Primary    Relevant Medications    order for DME    Other Relevant Orders    XR Knee Left 1/2 Views (Completed)    PHYSICAL THERAPY REFERRAL               Patient Instructions     RICE advice    Tylenol and/or Ibuprofen for comfort.    Set up physical therapy appointment at any clinic of your choice.       Knee Sprain    A sprain is an injury to the ligaments or capsule that holds a joint together. There are no broken bones. Most sprains take 3 to 6 weeks to heal. If it a severe sprain where the ligament is completely torn, it can take months to recover.  Most knee sprains are treated with a splint, knee immobilizer brace, or elastic wrap for support. " Severe sprains may require surgery.  Home care    Stay off the injured leg as much as possible until you can walk on it without pain. If you have a lot of pain with walking, crutches or a walker may be prescribed. (These can be rented or purchased at many pharmacies and surgical or orthopedic supply stores). Follow your healthcare provider's advice about when to begin putting weight on that leg.    Keep your leg elevated to reduce pain and swelling. When sleeping, place a pillow under the injured leg. When sitting, support the injured leg so it is level with your waist. This is very important during the first 48 hours.    Apply an ice pack over the injured area for 15 to 20 minutes every 3 to 6 hours. You should do this for the first 24 to 48 hours. You can make an ice pack by filling a plastic bag that seals at the top with ice cubes and then wrapping it with a thin towel. Continue to use ice packs for relief of pain and swelling as needed. As the ice melts, be careful to avoid getting your wrap, splint, or cast wet. After 48 hours, apply heat (warm shower or warm bath) for 15 to 20 minutes several times a day, or alternate ice and heat. You can place the ice pack directly over the splint. If you have to wear a hook-and-loop knee brace, you can open it to apply the ice pack, or heat, directly to the knee. Never put ice directly on the skin. Always wrap the ice in a towel or other type of cloth.    You may use over-the-counter pain medicine to control pain, unless another pain medicine was prescribed.If you have chronic liver or kidney disease or ever had a stomach ulcer or GI bleeding, talk with your healthcare provider before using these medicines.    If you were given a splint, keep it completely dry at all times. Bathe with your splint out of the water, protected with 2 large plastic bags, rubber-banded at the top end. If a fiberglass splint gets wet, you can dry it with a hair dryer. If you have  a hook-and-loop knee brace, you can remove this to bathe, unless told otherwise.  Follow-up care  Follow up with your doctor as advised. Any X-rays you had today don t show any broken bones, breaks, or fractures. Sometimes fractures don t show up on the first X-ray. Bruises and sprains can sometimes hurt as much as a fracture. These injuries can take time to heal completely. If your symptoms don t improve or they get worse, talk with your doctor. You may need a repeat X-ray. If X-rays were taken, you will be told of any new findings that may affect your care.  Call 911  Call 911 if you have:     Shortness of breath     Chest pain  When to seek medical advice  Call your healthcare provider right away if any of these occur:    The splint or knee immobilizer brace becomes wet or soft    The fiberglass cast or splint remains wet for more than 24 hours    Pain or swelling increases    The injured leg or toes become cold, blue, numb, or tingly  Date Last Reviewed: 11/20/2015 2000-2017 The [employer] [address] [place] [address]. All rights reserved. This information is not intended as a substitute for professional medical care. Always follow your healthcare professional's instructions.        RICE     Rest an injury, elevate it, and use ice and compression as directed.   RICE stands for rest, ice, compression, and elevation. These can limit pain and swelling after an injury. RICE may be recommended to help treat fractures, sprains, strains, and bruises or bumps.   Home care  The following explain the details of RICE:    Rest. Limit the use of the injured body part. This helps prevent further damage to the body part and gives it time to heal. In some cases, you may need a sling, brace, splint, or cast to help keep the body part still until it has healed.    Ice. Applying ice right after an injury helps relieve pain and swelling. Wrap a bag of ice in a thin towel. Then, place it over the injured  area. Do this for 10 to 15 minutes every 3 to 4 hours. Continue for the next 1 to 3 days or until your symptoms improve. Never put ice directly on your skin or ice an area longer than 15 minutes at a time.    Compression. Putting pressure on an injury helps reduce swelling and provides support. Wrap the injured area firmly with an elastic bandage/wrap. Make sure not to wrap the bandage too tightly or you will cut off blood flow to the injured area. If your bandage loosens, rewrap it.    Elevation. Keeping an injury raised above the level of your heart reduces swelling, pain, and throbbing. For instance, if you have a broken leg, it may help to rest your leg on several pillows when sitting or lying down. Try to keep the injured area elevated for at least 2 to 3 hours per day.  Follow-up care  Follow up with your healthcare provider, or as advised.  When to seek medical advice  Call your healthcare provider right away if any of these occur:    Fever of 100.4 F (38 C) or higher, or as directed by your healthcare provider    Increased pain or swelling in the injured body part    Injured body part becomes cold, blue, numb, or tingly    Signs of infection. These include warmth in the skin, redness, drainage, or bad smell coming from the injured body part.  Date Last Reviewed: 1/18/2016 2000-2017 [employer]. [address] [place] [address], [place] [address]. All rights reserved. This information is not intended as a substitute for professional medical care. Always follow your healthcare professional's instructions.            TANA Anderson [facility]

## 2018-02-28 NOTE — NURSING NOTE
"Chief Complaint   Patient presents with     Knee Pain     Injury yesterday.       Initial /90 (BP Location: Right arm, Patient Position: Chair, Cuff Size: Adult Large)  Pulse 72  Temp 98.8  F (37.1  C) (Tympanic)  Resp 18  Ht 5' 6\" (1.676 m)  Wt 249 lb (112.9 kg)  Breastfeeding? No  BMI 40.19 kg/m2 Estimated body mass index is 40.19 kg/(m^2) as calculated from the following:    Height as of this encounter: 5' 6\" (1.676 m).    Weight as of this encounter: 249 lb (112.9 kg).      Health Maintenance that is potentially due pending provider review:  Mammogram, Pap Smear, Colonoscopy/FIT and BP was high, used pink card, recheck manually    Gave pt phone number/pended order to schedule mammo and/or colonoscopy(or FIT)    Is there anyone who you would like to be able to receive your results? No  If yes have patient fill out LEANA  Lorin Harris CMA    "

## 2018-03-08 ENCOUNTER — HOSPITAL ENCOUNTER (OUTPATIENT)
Dept: PHYSICAL THERAPY | Facility: CLINIC | Age: 68
Setting detail: THERAPIES SERIES
End: 2018-03-08
Attending: NURSE PRACTITIONER
Payer: OTHER MISCELLANEOUS

## 2018-03-08 PROCEDURE — 40000718 ZZHC STATISTIC PT DEPARTMENT ORTHO VISIT: Performed by: PHYSICAL THERAPIST

## 2018-03-08 PROCEDURE — 97110 THERAPEUTIC EXERCISES: CPT | Mod: GP | Performed by: PHYSICAL THERAPIST

## 2018-03-08 PROCEDURE — 97161 PT EVAL LOW COMPLEX 20 MIN: CPT | Mod: GP | Performed by: PHYSICAL THERAPIST

## 2018-03-08 PROCEDURE — 97535 SELF CARE MNGMENT TRAINING: CPT | Mod: GP | Performed by: PHYSICAL THERAPIST

## 2018-03-09 DIAGNOSIS — N32.81 OVERACTIVE BLADDER: ICD-10-CM

## 2018-03-09 DIAGNOSIS — M25.552 HIP PAIN, LEFT: ICD-10-CM

## 2018-03-09 RX ORDER — MELOXICAM 15 MG/1
TABLET ORAL
Qty: 30 TABLET | Refills: 0 | Status: SHIPPED | OUTPATIENT
Start: 2018-03-09 | End: 2018-05-02

## 2018-03-09 RX ORDER — OXYBUTYNIN CHLORIDE 5 MG/1
TABLET ORAL
Qty: 270 TABLET | Refills: 3 | Status: SHIPPED | OUTPATIENT
Start: 2018-03-09 | End: 2019-04-28

## 2018-03-20 ENCOUNTER — TELEPHONE (OUTPATIENT)
Dept: FAMILY MEDICINE | Facility: CLINIC | Age: 68
End: 2018-03-20

## 2018-03-21 ENCOUNTER — TELEPHONE (OUTPATIENT)
Dept: FAMILY MEDICINE | Facility: CLINIC | Age: 68
End: 2018-03-21

## 2018-03-21 DIAGNOSIS — E03.9 HYPOTHYROIDISM, UNSPECIFIED TYPE: ICD-10-CM

## 2018-03-21 NOTE — TELEPHONE ENCOUNTER
Audelia says someone was going to mail her lab results to her from 2/23 but she never got them. (There are no physician comments on them).    Please mail results to her.

## 2018-03-22 ENCOUNTER — TELEPHONE (OUTPATIENT)
Dept: FAMILY MEDICINE | Facility: CLINIC | Age: 68
End: 2018-03-22

## 2018-03-22 ENCOUNTER — HOSPITAL ENCOUNTER (OUTPATIENT)
Dept: PHYSICAL THERAPY | Facility: CLINIC | Age: 68
Setting detail: THERAPIES SERIES
End: 2018-03-22
Attending: NURSE PRACTITIONER
Payer: OTHER MISCELLANEOUS

## 2018-03-22 ENCOUNTER — ALLIED HEALTH/NURSE VISIT (OUTPATIENT)
Dept: FAMILY MEDICINE | Facility: CLINIC | Age: 68
End: 2018-03-22
Payer: COMMERCIAL

## 2018-03-22 VITALS — SYSTOLIC BLOOD PRESSURE: 144 MMHG | DIASTOLIC BLOOD PRESSURE: 84 MMHG | HEART RATE: 60 BPM

## 2018-03-22 DIAGNOSIS — I10 HYPERTENSION GOAL BP (BLOOD PRESSURE) < 140/90: ICD-10-CM

## 2018-03-22 DIAGNOSIS — I10 HYPERTENSION GOAL BP (BLOOD PRESSURE) < 140/90: Primary | ICD-10-CM

## 2018-03-22 PROCEDURE — 99207 ZZC NO CHARGE NURSE ONLY: CPT

## 2018-03-22 PROCEDURE — 97110 THERAPEUTIC EXERCISES: CPT | Mod: GP | Performed by: PHYSICAL THERAPIST

## 2018-03-22 PROCEDURE — 40000718 ZZHC STATISTIC PT DEPARTMENT ORTHO VISIT: Performed by: PHYSICAL THERAPIST

## 2018-03-22 RX ORDER — LOSARTAN POTASSIUM 100 MG/1
100 TABLET ORAL DAILY
Qty: 90 TABLET | Refills: 3 | Status: SHIPPED | OUTPATIENT
Start: 2018-03-22 | End: 2019-04-13

## 2018-03-22 RX ORDER — LEVOTHYROXINE SODIUM 150 UG/1
TABLET ORAL
Qty: 30 TABLET | Refills: 0 | Status: SHIPPED | OUTPATIENT
Start: 2018-03-22 | End: 2018-04-24

## 2018-03-22 NOTE — TELEPHONE ENCOUNTER
S-(situation): RN visit for BP check prior to PT appt.    B-(background): Takes losartan 50 mg daily, amlodipine 10 mg daily, took meds approx 1 hr ago. Denies s/e. No recent home BP monitoring but does recall similar home readings similar to today's reading.  Component      Latest Ref Rng & Units 2/23/2018   Sodium      133 - 144 mmol/L 139   Potassium      3.4 - 5.3 mmol/L 3.6   Chloride      94 - 109 mmol/L 106   Carbon Dioxide      20 - 32 mmol/L 28   Anion Gap      3 - 14 mmol/L 5   Glucose      70 - 99 mg/dL 113 (H)   Urea Nitrogen      7 - 30 mg/dL 16   Creatinine      0.52 - 1.04 mg/dL 0.52   GFR Estimate      >60 mL/min/1.7m2 >90   GFR Estimate If Black      >60 mL/min/1.7m2 >90   Calcium      8.5 - 10.1 mg/dL 8.8   Bilirubin Total      0.2 - 1.3 mg/dL 0.4   Albumin      3.4 - 5.0 g/dL 3.6   Protein Total      6.8 - 8.8 g/dL 7.5   Alkaline Phosphatase      40 - 150 U/L 113   ALT      0 - 50 U/L 25   AST      0 - 45 U/L 19       A-(assessment):   BP Readings from Last 6 Encounters:   03/22/18 144/84   02/28/18 144/90   12/07/17 136/86   12/23/16 132/80   11/08/16 138/82   07/15/16 120/72     HR 60    R-(recommendations): Advise resume regular home BP monitoring, call or drop off readings in 1 wk. Forwarded to PCP for review, further recommendations.   HEBERT Izquierdo RN

## 2018-03-22 NOTE — MR AVS SNAPSHOT
After Visit Summary   3/22/2018    Audelia Aceves    MRN: [id number]           Patient Information     Date Of Birth          1950        Visit Information        Provider Department      3/22/2018 11:00 AM RADHA PATTERSON RN [facility]        Today's Diagnoses     Hypertension goal BP (blood pressure) < 140/90    -  1       Follow-ups after your visit        Who to contact     If you have questions or need follow up information about today's clinic visit or your schedule please contact [facility] directly at [phone].  Normal or non-critical lab and imaging results will be communicated to you by MyChart, letter or phone within 4 business days after the clinic has received the results. If you do not hear from us within 7 days, please contact the clinic through MyChart or phone. If you have a critical or abnormal lab result, we will notify you by phone as soon as possible.  Submit refill requests through [employer] or call your pharmacy and they will forward the refill request to us. Please allow 3 business days for your refill to be completed.          Additional Information About Your Visit        Care EveryWhere ID     This is your Care EveryWhere ID. This could be used by other organizations to access your [facility] medical records  [id number]        Your Vitals Were     Pulse                   60            Blood Pressure from Last 3 Encounters:   03/22/18 144/84   02/28/18 144/90   12/07/17 136/86    Weight from Last 3 Encounters:   02/28/18 249 lb (112.9 kg)   12/07/17 248 lb (112.5 kg)   12/23/16 245 lb (111.1 kg)              Today, you had the following     No orders found for display       Primary Care Provider Office Phone # Fax #    Rajat Day [phone] [address] [place], [place] [address]        Equal Access to Services     CIARRA VANG AH: Kyle Drew, rosita guzman, roge morel  jamie gilcristine lolaivory valentinoaakeshia ah. So [facility] [phone].    ATENCIÓN: Si dila libby, tiene a blancas disposición servicios gratuitos de asistencia lingüística. Christiano terry [phone].    We comply with applicable federal civil rights laws and [place] laws. We do not discriminate on the basis of race, color, national origin, age, disability, sex, sexual orientation, or gender identity.            Thank you!     Thank you for choosing [facility]  for your care. Our goal is always to provide you with excellent care. Hearing back from our patients is one way we can continue to improve our services. Please take a few minutes to complete the written survey that you may receive in the mail after your visit with us. Thank you!             Your Updated Medication List - Protect others around you: Learn how to safely use, store and throw away your medicines at www.disposemymeds.org.          This list is accurate as of 3/22/18 11:36 AM.  Always use your most recent med list.                   Brand Name Dispense Instructions for use Diagnosis    amLODIPine 10 MG tablet    NORVASC    90 tablet    TAKE ONE TABLET BY MOUTH ONCE DAILY    Hypertension goal BP (blood pressure) < 140/90       aspirin 81 MG tablet      Take by mouth daily        calcium 600-200 MG-UNIT Tabs     90 tablet    Take 1 tablet by mouth daily    Hypertension goal BP (blood pressure) < 140/90       Glucosamine HCl 1000 MG Tabs      Take 1 tablet by mouth 2 times daily.        levothyroxine 150 MCG tablet    SYNTHROID/LEVOTHROID    90 tablet    TAKE ONE TABLET BY MOUTH ONCE DAILY    Hypothyroidism, unspecified type       losartan 50 MG tablet    COZAAR    90 tablet    TAKE ONE TABLET BY MOUTH ONCE DAILY    Hypertension goal BP (blood pressure) < 140/90       lovastatin 40 MG tablet    MEVACOR    90 tablet    TAKE ONE TABLET BY MOUTH AT BEDTIME    Hyperlipidemia LDL goal <130       meloxicam 15 MG tablet    MOBIC    30 tablet    TAKE 1/2 TABLET BY  MOUTH TWO TIMES A DAY    Hip pain, left       nitroGLYcerin 0.4 MG sublingual tablet    NITROSTAT    25 tablet    Place 1 tablet (0.4 mg) under the tongue every 5 minutes as needed for chest pain    Chest pain, unspecified type       omeprazole 20 MG CR capsule    priLOSEC    90 capsule    TAKE ONE CAPSULE BY MOUTH ONCE DAILY 30-60 MINUTES BEFORE A MEAL    Heartburn       order for DME     1 Device    Equipment being ordered: left knee brace    Knee strain, left, initial encounter       oxybutynin 5 MG tablet    DITROPAN    270 tablet    TAKE ONE TABLET BY MOUTH THREE TIMES A DAY    Overactive bladder       vitamin D3 2000 UNITS Caps      Take 1 capsule by mouth

## 2018-03-22 NOTE — TELEPHONE ENCOUNTER
Please call.  BP borderline high.   PLAN: I suggest increasing losartan to 100mg daily from current 50mg.    I will send prescription.   She can take two of the 50mg pills daily until gone.   GENA PERSAUD MD

## 2018-03-22 NOTE — TELEPHONE ENCOUNTER
Pt informed/ advised as noted per MD.  Advised nurse only BP check in 2 wks, bring home BP readings in.  Pt voices understanding/ agreement.  HEBERT Izquierdo RN

## 2018-03-22 NOTE — PROGRESS NOTES
Outpatient Physical Therapy Discharge Note     Patient: Audelia Aceves  : 1950    Beginning/End Dates of Reporting Period:  3/8/18 to 3/22/2018    Referring Provider: [place]     Therapy Diagnosis: acute knee pain     Client Self Report: Trip to [place] went well, has very little pain. Able to return to normal activities    Objective Measurements:      LEFS 66   (initial 45)        Goals:  Goal Identifier 1   Goal Description Patient will be able to walk 10 minutes without increase in pain for walking on vacation   Target Date 18   Date Met  18   Progress:     Goal Identifier 2   Goal Description Patient will be able to stand 1 hour for work duties   Target Date 18   Date Met  18   Progress:     Goal Identifier 3   Goal Description Patient will be independant with HEP for long term self management   Target Date 18   Date Met  18   Progress:     Goal Identifier     Goal Description     Target Date     Date Met      Progress:     Goal Identifier     Goal Description     Target Date     Date Met      Progress:     Goal Identifier     Goal Description     Target Date     Date Met      Progress:     Goal Identifier     Goal Description     Target Date     Date Met      Progress:     Goal Identifier     Goal Description     Target Date     Date Met      Progress:     Progress Toward Goals:   Progress this reporting period: Pt attended 2 PT sessions and made significant progress with decreases in pain and reporting improvement in tolerance to functional activity. Pain resolved and pt learned HEP to continue.         Plan:  Discharge from therapy.    Discharge:    Reason for Discharge: Patient has met all goals.  No further expectation of progress.  Patient chooses to discontinue therapy.    Equipment Issued: none    Discharge Plan: Patient to continue home program.

## 2018-03-28 ENCOUNTER — DOCUMENTATION ONLY (OUTPATIENT)
Dept: LAB | Facility: CLINIC | Age: 68
End: 2018-03-28

## 2018-03-28 DIAGNOSIS — E03.9 HYPOTHYROIDISM, UNSPECIFIED TYPE: Primary | ICD-10-CM

## 2018-03-28 NOTE — PROGRESS NOTES
Patient is scheduled for a lab appointment on 4/10/18.  Please place future order for labs needed.  Thank you.

## 2018-04-16 ENCOUNTER — ALLIED HEALTH/NURSE VISIT (OUTPATIENT)
Dept: FAMILY MEDICINE | Facility: CLINIC | Age: 68
End: 2018-04-16
Payer: COMMERCIAL

## 2018-04-16 VITALS — SYSTOLIC BLOOD PRESSURE: 132 MMHG | DIASTOLIC BLOOD PRESSURE: 94 MMHG | HEART RATE: 64 BPM

## 2018-04-16 DIAGNOSIS — I10 HYPERTENSION GOAL BP (BLOOD PRESSURE) < 140/90: Primary | ICD-10-CM

## 2018-04-16 DIAGNOSIS — E03.9 HYPOTHYROIDISM, UNSPECIFIED TYPE: ICD-10-CM

## 2018-04-16 PROCEDURE — 84443 ASSAY THYROID STIM HORMONE: CPT | Performed by: FAMILY MEDICINE

## 2018-04-16 PROCEDURE — 99207 ZZC NO CHARGE NURSE ONLY: CPT

## 2018-04-16 PROCEDURE — 36415 COLL VENOUS BLD VENIPUNCTURE: CPT | Performed by: FAMILY MEDICINE

## 2018-04-17 LAB — TSH SERPL DL<=0.005 MIU/L-ACNC: 2.79 MU/L (ref 0.4–4)

## 2018-04-23 ENCOUNTER — OFFICE VISIT (OUTPATIENT)
Dept: FAMILY MEDICINE | Facility: CLINIC | Age: 68
End: 2018-04-23
Payer: OTHER MISCELLANEOUS

## 2018-04-23 VITALS
HEIGHT: 66 IN | TEMPERATURE: 97.9 F | BODY MASS INDEX: 40.43 KG/M2 | DIASTOLIC BLOOD PRESSURE: 83 MMHG | WEIGHT: 251.6 LBS | RESPIRATION RATE: 16 BRPM | HEART RATE: 62 BPM | SYSTOLIC BLOOD PRESSURE: 153 MMHG

## 2018-04-23 DIAGNOSIS — M17.0 OSTEOARTHRITIS OF BOTH KNEES, UNSPECIFIED OSTEOARTHRITIS TYPE: ICD-10-CM

## 2018-04-23 DIAGNOSIS — S86.912D STRAIN OF LEFT KNEE, SUBSEQUENT ENCOUNTER: Primary | ICD-10-CM

## 2018-04-23 PROCEDURE — 99213 OFFICE O/P EST LOW 20 MIN: CPT | Performed by: FAMILY MEDICINE

## 2018-04-23 NOTE — LETTER
[facility] [address]  [address] [place] [id number]  Phone: [phone]  Fax: [phone]      REPORT OF WORK ABILITY    NOTE TO EMPLOYEE: You must promptly provide a copy of this report to your  employer or worker's compensation insurer, and Qualified Rehabilitation Consultant.    Date: 4/23/2018                     Employee Name: Audelia Aceves         YOB: 1950  Medical Record Number: [id number]   Soc.Sec.No: [id number]  Employer: None                Date of Injury: 2/27/2018  Managed Care Organization / Insurance Company Name: UNKNOWN    Diagnosis: left knee strain  Work Related: yes     MMI: YES - Date: 4/23/2018   Permanent Partial Disability(PPD) likely: NO    EMPLOYEE IS ABLE TO WORK: unrestricted as of 4/24/2018 - Full shift     RESTRICTIONS IF ANY:None    TREATMENT PLAN/NOTES: No additional treatment needed.      Rajat Day MD

## 2018-04-23 NOTE — MR AVS SNAPSHOT
"              After Visit Summary   4/23/2018    Audelia Aceves    MRN: [id number]           Patient Information     Date Of Birth          1950        Visit Information        Provider Department      4/23/2018 1:40 PM Rajat Day MD [facility]        Today's Diagnoses     Strain of left knee, subsequent encounter    -  1    Osteoarthritis of both knees, unspecified osteoarthritis type           Follow-ups after your visit        Who to contact     If you have questions or need follow up information about today's clinic visit or your schedule please contact [facility] directly at [phone].  Normal or non-critical lab and imaging results will be communicated to you by MyChart, letter or phone within 4 business days after the clinic has received the results. If you do not hear from us within 7 days, please contact the clinic through MyChart or phone. If you have a critical or abnormal lab result, we will notify you by phone as soon as possible.  Submit refill requests through [employer] or call your pharmacy and they will forward the refill request to us. Please allow 3 business days for your refill to be completed.          Additional Information About Your Visit        Care EveryWhere ID     This is your Care EveryWhere ID. This could be used by other organizations to access your [facility] medical records  [id number]        Your Vitals Were     Pulse Temperature Respirations Height BMI (Body Mass Index)       62 97.9  F (36.6  C) (Tympanic) 16 5' 6\" (1.676 m) 40.61 kg/m2        Blood Pressure from Last 3 Encounters:   04/23/18 153/83   04/16/18 (!) 132/94   03/22/18 144/84    Weight from Last 3 Encounters:   04/23/18 251 lb 9.6 oz (114.1 kg)   02/28/18 249 lb (112.9 kg)   12/07/17 248 lb (112.5 kg)              Today, you had the following     No orders found for display       Primary Care Provider Office Phone # Fax #    Rajat Day [phone] " [phone]       [address] [place] [address]        Equal Access to Services     FANNYMIRANDA CIERA : Hadii aad ku haddavallen Drew, wamariposada kenishasheaha, qadeeptiping cabezaslinnearusty truongwagnerrusty, waxay idiin haystephaniekeshia davilamaurojian siu. So [facility] [phone].    ATENCIÓN: Si habla español, tiene a blancas disposición servicios gratuitos de asistencia lingüística. Cherylame al [phone].    We comply with applicable federal civil rights laws and [place] laws. We do not discriminate on the basis of race, color, national origin, age, disability, sex, sexual orientation, or gender identity.            Thank you!     Thank you for choosing [facility]  for your care. Our goal is always to provide you with excellent care. Hearing back from our patients is one way we can continue to improve our services. Please take a few minutes to complete the written survey that you may receive in the mail after your visit with us. Thank you!             Your Updated Medication List - Protect others around you: Learn how to safely use, store and throw away your medicines at www.disposemymeds.org.          This list is accurate as of 4/23/18  9:44 PM.  Always use your most recent med list.                   Brand Name Dispense Instructions for use Diagnosis    amLODIPine 10 MG tablet    NORVASC    90 tablet    TAKE ONE TABLET BY MOUTH ONCE DAILY    Hypertension goal BP (blood pressure) < 140/90       aspirin 81 MG tablet      Take by mouth daily        calcium 600-200 MG-UNIT Tabs     90 tablet    Take 1 tablet by mouth daily    Hypertension goal BP (blood pressure) < 140/90       Glucosamine HCl 1000 MG Tabs      Take 1 tablet by mouth 2 times daily.        levothyroxine 150 MCG tablet    SYNTHROID/LEVOTHROID    30 tablet    TAKE ONE TABLET BY MOUTH ONCE DAILY    Hypothyroidism, unspecified type       losartan 100 MG tablet    COZAAR    90 tablet    Take 1 tablet (100 mg) by mouth daily    Hypertension goal BP (blood pressure) < 140/90        lovastatin 40 MG tablet    MEVACOR    90 tablet    TAKE ONE TABLET BY MOUTH AT BEDTIME    Hyperlipidemia LDL goal <130       meloxicam 15 MG tablet    MOBIC    30 tablet    TAKE 1/2 TABLET BY MOUTH TWO TIMES A DAY    Hip pain, left       nitroGLYcerin 0.4 MG sublingual tablet    NITROSTAT    25 tablet    Place 1 tablet (0.4 mg) under the tongue every 5 minutes as needed for chest pain    Chest pain, unspecified type       omeprazole 20 MG CR capsule    priLOSEC    90 capsule    TAKE ONE CAPSULE BY MOUTH ONCE DAILY 30-60 MINUTES BEFORE A MEAL    Heartburn       order for DME     1 Device    Equipment being ordered: left knee brace    Knee strain, left, initial encounter       oxybutynin 5 MG tablet    DITROPAN    270 tablet    TAKE ONE TABLET BY MOUTH THREE TIMES A DAY    Overactive bladder       vitamin D3 2000 units Caps      Take 1 capsule by mouth

## 2018-04-23 NOTE — PROGRESS NOTES
"  SUBJECTIVE:   Audelia Aceves is a 67 year old female who presents to clinic today for the following health issues:  Chief Complaint   Patient presents with     Work Comp     Left leg and knee, patient states no further pain, patient needs return to work letter        Musculoskeletal problem/pain      Duration: Feb 27th 2018    Description  Location: Left knee and leg    Intensity:  mild    Accompanying signs and symptoms: none    History  Previous similar problem: no   Previous evaluation:  x-ray    Precipitating or alleviating factors:  Trauma or overuse: YES- work comp    Aggravating factors include: none    Therapies tried and outcome: Physical therapy    Seen 2/28 for left knee strain.  Related to fall at work on 2/27 where she tripped over a floor grate, caught herself as she fell.   She had pain lateral knee and thigh.   Was in physical therapy for a couple sessions and did exercises at home.   Has not had work restrictions.   Knee has been doing well.  Some clicks.  This knee has been worse than right knee.  Some pain in both knees if turns the wrong way.  Goes up stairs one at a time.  Has to baby knees. Does not want to have surgery.  She has been told she has obstructive sleep apnea and it is \"bone on bone\".   She feels it is back to her baseline prior to the injury.   No swelling in that leg or knee.     Patient Active Problem List   Diagnosis     Hypertension goal BP (blood pressure) < 140/90     Obesity (BMI 30-39.9)     Hypothyroidism     Hyperlipidemia LDL goal <130     OA (osteoarthritis) of knee     Advanced directives, counseling/discussion     Chest pain      OBJECTIVE:Blood pressure 153/83, pulse 62, temperature 97.9  F (36.6  C), temperature source Tympanic, resp. rate 16, height 5' 6\" (1.676 m), weight 251 lb 9.6 oz (114.1 kg), not currently breastfeeding. BMI=Body mass index is 40.61 kg/(m^2).  GENERAL APPEARANCE ADULT: Alert, no acute distress, morbidly obese  MS: knee exam normal " knee exam, no swelling, tenderness, effusion or instability; ligaments intact.  She has mild to moderate decrease in range of motion.     ASSESSMENT:     ICD-10-CM    1. Strain of left knee, subsequent encounter S86.912D    2. Osteoarthritis of both knees, unspecified osteoarthritis type M17.0       She is back to baseline, strain has healed.  She has underlying osteoarthritis.  Note for continuing work without restriction.     GENA PERSAUD MD

## 2018-04-23 NOTE — NURSING NOTE
"Chief Complaint   Patient presents with     Work Comp     Left leg and knee, patient states no further pain, patient needs return to work letter       Initial Resp 16  Ht 5' 6\" (1.676 m)  Wt 251 lb 9.6 oz (114.1 kg)  BMI 40.61 kg/m2 Estimated body mass index is 40.61 kg/(m^2) as calculated from the following:    Height as of this encounter: 5' 6\" (1.676 m).    Weight as of this encounter: 251 lb 9.6 oz (114.1 kg).  Medication Reconciliation: complete    "

## 2018-04-24 DIAGNOSIS — E03.9 HYPOTHYROIDISM, UNSPECIFIED TYPE: ICD-10-CM

## 2018-04-24 RX ORDER — LEVOTHYROXINE SODIUM 150 UG/1
TABLET ORAL
Qty: 90 TABLET | Refills: 3 | Status: SHIPPED | OUTPATIENT
Start: 2018-04-24 | End: 2019-04-22

## 2018-04-25 ENCOUNTER — TELEPHONE (OUTPATIENT)
Dept: FAMILY MEDICINE | Facility: CLINIC | Age: 68
End: 2018-04-25

## 2018-04-26 NOTE — TELEPHONE ENCOUNTER
Form signed faxed and sent to scanning.  [facility]  [facility] [place]

## 2018-05-15 DIAGNOSIS — R12 HEARTBURN: ICD-10-CM

## 2018-05-15 NOTE — TELEPHONE ENCOUNTER
"Requested Prescriptions   Pending Prescriptions Disp Refills     omeprazole (PRILOSEC) 20 MG CR capsule [Pharmacy Med Name: OMEPRAZOLE 20MG CPDR] 90 capsule 0     Sig: TAKE 1 CAPSULE BY MOUTH DAILY 20-60 MINUTES BEFORE A MEAL    PPI Protocol Passed    5/15/2018 12:11 PM       Passed - Not on Clopidogrel (unless Pantoprazole ordered)       Passed - No diagnosis of osteoporosis on record       Passed - Recent (12 mo) or future (30 days) visit within the authorizing provider's specialty    Patient had office visit in the last 12 months or has a visit in the next 30 days with authorizing provider or within the authorizing provider's specialty.  See \"Patient Info\" tab in inbasket, or \"Choose Columns\" in Meds & Orders section of the refill encounter.           Passed - Patient is age 18 or older       Passed - No active pregnacy on record       Passed - No positive pregnancy test in past 12 months        Last Written Prescription Date:  2/5/18  Last Fill Quantity: 90,  # refills: 0   Last office visit: 4/23/2018 with prescribing provider:     Future Office Visit:      "

## 2018-07-10 DIAGNOSIS — M25.552 HIP PAIN, LEFT: ICD-10-CM

## 2018-07-10 RX ORDER — MELOXICAM 15 MG/1
TABLET ORAL
Qty: 30 TABLET | Refills: 0 | Status: SHIPPED | OUTPATIENT
Start: 2018-07-10 | End: 2018-09-05

## 2018-09-05 DIAGNOSIS — M25.552 HIP PAIN, LEFT: ICD-10-CM

## 2018-09-05 RX ORDER — MELOXICAM 15 MG/1
7.5 TABLET ORAL DAILY PRN
Qty: 30 TABLET | Refills: 1 | Status: SHIPPED | OUTPATIENT
Start: 2018-09-05 | End: 2018-09-13

## 2018-09-05 NOTE — TELEPHONE ENCOUNTER
"Requested Prescriptions   Pending Prescriptions Disp Refills     meloxicam (MOBIC) 15 MG tablet [Pharmacy Med Name: MELOXICAM 15MG TABS] 30 tablet 0     Sig: TAKE ONE-HALF TABLET BY MOUTH TWICE A DAY    NSAID Medications Failed    9/5/2018  7:43 AM       Failed - Blood pressure under 140/90 in past 12 months    BP Readings from Last 3 Encounters:   04/23/18 153/83   04/16/18 (!) 132/94   03/22/18 144/84                Failed - Patient is age 6-64 years       Passed - Normal ALT on file in past 12 months    Recent Labs   Lab Test  02/23/18   0749   ALT  25            Passed - Normal AST on file in past 12 months    Recent Labs   Lab Test  02/23/18   0749   AST  19            Passed - Recent (12 mo) or future (30 days) visit within the authorizing provider's specialty    Patient had office visit in the last 12 months or has a visit in the next 30 days with authorizing provider or within the authorizing provider's specialty.  See \"Patient Info\" tab in inbasket, or \"Choose Columns\" in Meds & Orders section of the refill encounter.           Passed - Normal CBC on file in past 12 months    Recent Labs   Lab Test  02/23/18   0749   WBC  7.5   RBC  4.94   HGB  14.1   HCT  44.0   PLT  280       For [facility] ONLY: [id number] = WBC, [id number] = RBC         Passed - No active pregnancy on record       Passed - Normal serum creatinine on file in past 12 months    Recent Labs   Lab Test  02/23/18   0749   CR  0.52            Passed - No positive pregnancy test in past 12 months        Last Written Prescription Date:  7/10/18  Last Fill Quantity: 30,  # refills: 0   Last office visit: 4/23/2018 with prescribing provider:      Future Office Visit:      "

## 2018-09-13 ENCOUNTER — TELEPHONE (OUTPATIENT)
Dept: FAMILY MEDICINE | Facility: CLINIC | Age: 68
End: 2018-09-13

## 2018-09-13 DIAGNOSIS — M25.552 HIP PAIN, LEFT: ICD-10-CM

## 2018-09-13 RX ORDER — MELOXICAM 15 MG/1
7.5 TABLET ORAL 2 TIMES DAILY PRN
Qty: 30 TABLET | Refills: 1 | COMMUNITY
Start: 2018-09-13 | End: 2019-05-20

## 2018-09-13 NOTE — TELEPHONE ENCOUNTER
Audelia says she just got a refill of her meloxicam (MOBIC) 15 MG tablet  but there is a discrepancy on the directions from what she had before. She says she had been taking 1/2 tablet 2x a day and the directions now say 1/2 tab daily as needed. Please call as she wants to know what to do. [phone]

## 2018-11-01 DIAGNOSIS — E78.5 HYPERLIPIDEMIA LDL GOAL <130: ICD-10-CM

## 2018-11-01 RX ORDER — LOVASTATIN 40 MG
TABLET ORAL
Qty: 90 TABLET | Refills: 0 | Status: SHIPPED | OUTPATIENT
Start: 2018-11-01 | End: 2019-01-31

## 2018-11-01 NOTE — TELEPHONE ENCOUNTER
Prescription approved per [facility] Refill Protocol.  Svitlana SWANSON RN

## 2018-11-01 NOTE — TELEPHONE ENCOUNTER
"Requested Prescriptions   Pending Prescriptions Disp Refills     lovastatin (MEVACOR) 40 MG tablet [Pharmacy Med Name: LOVASTATIN 40MG TABS] 90 tablet 1     Sig: TAKE ONE TABLET BY MOUTH AT BEDTIME    Statins Protocol Passed    11/1/2018  8:38 AM       Passed - LDL on file in past 12 months    Recent Labs   Lab Test  02/23/18   0749   LDL  92            Passed - No abnormal creatine kinase in past 12 months    No lab results found.            Passed - Recent (12 mo) or future (30 days) visit within the authorizing provider's specialty    Patient had office visit in the last 12 months or has a visit in the next 30 days with authorizing provider or within the authorizing provider's specialty.  See \"Patient Info\" tab in inbasket, or \"Choose Columns\" in Meds & Orders section of the refill encounter.             Passed - Patient is age 18 or older       Passed - No active pregnancy on record       Passed - No positive pregnancy test in past 12 months        Last Written Prescription Date:  5/3/18  Last Fill Quantity: 90,  # refills: 1   Last office visit: 4/23/2018 with prescribing provider:     Future Office Visit:      "

## 2018-11-15 DIAGNOSIS — R12 HEARTBURN: ICD-10-CM

## 2018-11-15 NOTE — TELEPHONE ENCOUNTER
"Requested Prescriptions   Pending Prescriptions Disp Refills     omeprazole (PRILOSEC) 20 MG CR capsule [Pharmacy Med Name: OMEPRAZOLE 20MG CPDR] 90 capsule 1     Sig: TAKE ONE CAPSULE BY MOUTH ONCE DAILY 20 - 60 MINUTES BEFORE A MEAL    PPI Protocol Passed    11/15/2018  7:06 AM       Passed - Not on Clopidogrel (unless Pantoprazole ordered)       Passed - No diagnosis of osteoporosis on record       Passed - Recent (12 mo) or future (30 days) visit within the authorizing provider's specialty    Patient had office visit in the last 12 months or has a visit in the next 30 days with authorizing provider or within the authorizing provider's specialty.  See \"Patient Info\" tab in inbasket, or \"Choose Columns\" in Meds & Orders section of the refill encounter.      Last Written Prescription Date:  5/15/18  Last Fill Quantity: 90,  # refills: 1   Last office visit: 4/23/2018 with prescribing provider:     Future Office Visit:               Passed - Patient is age 18 or older       Passed - No active pregnacy on record       Passed - No positive pregnancy test in past 12 months          "

## 2018-12-20 DIAGNOSIS — I10 HYPERTENSION GOAL BP (BLOOD PRESSURE) < 140/90: ICD-10-CM

## 2018-12-20 RX ORDER — AMLODIPINE BESYLATE 10 MG/1
TABLET ORAL
Qty: 30 TABLET | Refills: 0 | Status: SHIPPED | OUTPATIENT
Start: 2018-12-20 | End: 2019-01-18

## 2018-12-20 NOTE — TELEPHONE ENCOUNTER
"Requested Prescriptions   Pending Prescriptions Disp Refills     amLODIPine (NORVASC) 10 MG tablet [Pharmacy Med Name: AMLODIPINE BESYLATE 10MG TABS] 90 tablet 3     Sig: TAKE ONE TABLET BY MOUTH ONCE DAILY    Calcium Channel Blockers Protocol  Failed - 12/20/2018  8:52 AM       Failed - Blood pressure under 140/90 in past 12 months    BP Readings from Last 3 Encounters:   04/23/18 153/83   04/16/18 (!) 132/94   03/22/18 144/84                Passed - Recent (12 mo) or future (30 days) visit within the authorizing provider's specialty    Patient had office visit in the last 12 months or has a visit in the next 30 days with authorizing provider or within the authorizing provider's specialty.  See \"Patient Info\" tab in inbasket, or \"Choose Columns\" in Meds & Orders section of the refill encounter.      Last Written Prescription Date:  12/15/17  Last Fill Quantity: 90,  # refills: 3   Last office visit: 4/23/2018 with prescribing provider:     Future Office Visit:               Passed - Patient is age 18 or older       Passed - No active pregnancy on record       Passed - Normal serum creatinine on file in past 12 months    Recent Labs   Lab Test 02/23/18  0749   CR 0.52            Passed - No positive pregnancy test in past 12 months          "

## 2019-01-11 DIAGNOSIS — M25.552 HIP PAIN, LEFT: ICD-10-CM

## 2019-01-11 RX ORDER — MELOXICAM 15 MG/1
TABLET ORAL
Qty: 30 TABLET | Refills: 5 | Status: SHIPPED | OUTPATIENT
Start: 2019-01-11 | End: 2019-05-20

## 2019-01-11 NOTE — TELEPHONE ENCOUNTER
"Requested Prescriptions   Pending Prescriptions Disp Refills     meloxicam (MOBIC) 15 MG tablet [Pharmacy Med Name: MELOXICAM 15MG TABS] 30 tablet 1     Sig: TAKE ONE-HALF TABLET BY MOUTH EVERY DAY AS NEEDED    NSAID Medications Failed - 1/11/2019  7:58 AM       Failed - Blood pressure under 140/90 in past 12 months    BP Readings from Last 3 Encounters:   04/23/18 153/83   04/16/18 (!) 132/94   03/22/18 144/84                Failed - Patient is age 6-64 years       Passed - Normal ALT on file in past 12 months    Recent Labs   Lab Test 02/23/18  0749   ALT 25            Passed - Normal AST on file in past 12 months    Recent Labs   Lab Test 02/23/18  0749   AST 19            Passed - Recent (12 mo) or future (30 days) visit within the authorizing provider's specialty    Patient had office visit in the last 12 months or has a visit in the next 30 days with authorizing provider or within the authorizing provider's specialty.  See \"Patient Info\" tab in inbasket, or \"Choose Columns\" in Meds & Orders section of the refill encounter.             Passed - Normal CBC on file in past 12 months    Recent Labs   Lab Test 02/23/18  0749   WBC 7.5   RBC 4.94   HGB 14.1   HCT 44.0                   Passed - Medication is active on med list       Passed - No active pregnancy on record       Passed - Normal serum creatinine on file in past 12 months    Recent Labs   Lab Test 02/23/18  0749   CR 0.52            Passed - No positive pregnancy test in past 12 months        meloxicam (MOBIC) 15 MG tablet  Last Written Prescription Date:  09/13/2018  Last Fill Quantity: 30 tablet,  # refills: 1   Last office visit: 4/23/2018 with prescribing provider:  NILSA Day   Future Office Visit:      Abril Orozco RT (R) (M)    "

## 2019-01-18 DIAGNOSIS — I10 HYPERTENSION GOAL BP (BLOOD PRESSURE) < 140/90: ICD-10-CM

## 2019-01-18 RX ORDER — AMLODIPINE BESYLATE 10 MG/1
TABLET ORAL
Qty: 30 TABLET | Refills: 2 | Status: SHIPPED | OUTPATIENT
Start: 2019-01-18 | End: 2019-04-22

## 2019-01-18 NOTE — TELEPHONE ENCOUNTER
Routing refill request to provider for review/approval because:  Barb given x1 and patient did not follow up, please advise  BPs  BP Readings from Last 6 Encounters:   04/23/18 153/83   04/16/18 (!) 132/94   03/22/18 144/84   02/28/18 144/90   12/07/17 136/86   12/23/16 132/80     Svitlana SWANSON RN

## 2019-01-18 NOTE — TELEPHONE ENCOUNTER
"Requested Prescriptions   Pending Prescriptions Disp Refills     amLODIPine (NORVASC) 10 MG tablet [Pharmacy Med Name: AMLODIPINE BESYLATE 10MG TABS] 30 tablet 0     Sig: TAKE ONE TABLET BY MOUTH ONCE DAILY    Calcium Channel Blockers Protocol  Failed - 1/18/2019  8:29 AM       Failed - Blood pressure under 140/90 in past 12 months    BP Readings from Last 3 Encounters:   04/23/18 153/83   04/16/18 (!) 132/94   03/22/18 144/84                Passed - Recent (12 mo) or future (30 days) visit within the authorizing provider's specialty    Patient had office visit in the last 12 months or has a visit in the next 30 days with authorizing provider or within the authorizing provider's specialty.  See \"Patient Info\" tab in inbasket, or \"Choose Columns\" in Meds & Orders section of the refill encounter.             Passed - Medication is active on med list       Passed - Patient is age 18 or older       Passed - No active pregnancy on record       Passed - Normal serum creatinine on file in past 12 months    Recent Labs   Lab Test 02/23/18  0749   CR 0.52            Passed - No positive pregnancy test in past 12 months      amlodipine  Last Written Prescription Date:  12/20/18  Last Fill Quantity: 30,  # refills: 0   Last office visit: 4/23/2018 with prescribing provider:  Dr. Day   Future Office Visit:      "

## 2019-01-31 DIAGNOSIS — E78.5 HYPERLIPIDEMIA LDL GOAL <130: ICD-10-CM

## 2019-02-01 RX ORDER — LOVASTATIN 40 MG
TABLET ORAL
Qty: 90 TABLET | Refills: 0 | Status: SHIPPED | OUTPATIENT
Start: 2019-02-01 | End: 2019-04-28

## 2019-02-01 NOTE — TELEPHONE ENCOUNTER
"Requested Prescriptions   Pending Prescriptions Disp Refills     lovastatin (MEVACOR) 40 MG tablet [Pharmacy Med Name: LOVASTATIN 40MG TABS] 90 tablet 0     Sig: TAKE ONE TABLET BY MOUTH AT BEDTIME    Statins Protocol Passed - 1/31/2019  9:50 PM       Passed - LDL on file in past 12 months    Recent Labs   Lab Test 02/23/18  0749   LDL 92            Passed - No abnormal creatine kinase in past 12 months    No lab results found.            Passed - Recent (12 mo) or future (30 days) visit within the authorizing provider's specialty    Patient had office visit in the last 12 months or has a visit in the next 30 days with authorizing provider or within the authorizing provider's specialty.  See \"Patient Info\" tab in inbasket, or \"Choose Columns\" in Meds & Orders section of the refill encounter.             Passed - Medication is active on med list       Passed - Patient is age 18 or older       Passed - No active pregnancy on record       Passed - No positive pregnancy test in past 12 months      lovastatin (MEVACOR) 40 MG tablet  Last Written Prescription Date:  11/01/2018  Last Fill Quantity: 90 tablet,  # refills: 0   Last office visit: 4/23/2018 with prescribing provider:  NILSA Day   Future Office Visit:      Abril WEIR (SHANELL) (M)      "

## 2019-04-13 DIAGNOSIS — E78.5 HYPERLIPIDEMIA LDL GOAL <130: ICD-10-CM

## 2019-04-13 DIAGNOSIS — I10 HYPERTENSION GOAL BP (BLOOD PRESSURE) < 140/90: ICD-10-CM

## 2019-04-13 DIAGNOSIS — E03.9 HYPOTHYROIDISM, UNSPECIFIED TYPE: Primary | ICD-10-CM

## 2019-04-15 RX ORDER — LOSARTAN POTASSIUM 100 MG/1
TABLET ORAL
Qty: 90 TABLET | Refills: 0 | Status: SHIPPED | OUTPATIENT
Start: 2019-04-15 | End: 2019-05-20

## 2019-04-15 NOTE — TELEPHONE ENCOUNTER
"Requested Prescriptions   Pending Prescriptions Disp Refills     losartan (COZAAR) 100 MG tablet [Pharmacy Med Name: LOSARTAN POTASSIUM 100MG TABS] 90 tablet 3     Sig: TAKE 1 TABLET BY MOUTH DAILY       Angiotensin-II Receptors Failed - 4/13/2019  7:48 PM        Failed - Blood pressure under 140/90 in past 12 months     BP Readings from Last 3 Encounters:   04/23/18 153/83   04/16/18 (!) 132/94   03/22/18 144/84                 Failed - Normal serum creatinine on file in past 12 months     Recent Labs   Lab Test 02/23/18  0749   CR 0.52             Failed - Normal serum potassium on file in past 12 months     Recent Labs   Lab Test 02/23/18  0749   POTASSIUM 3.6                    Passed - Recent (12 mo) or future (30 days) visit within the authorizing provider's specialty     Patient had office visit in the last 12 months or has a visit in the next 30 days with authorizing provider or within the authorizing provider's specialty.  See \"Patient Info\" tab in inbasket, or \"Choose Columns\" in Meds & Orders section of the refill encounter.              Passed - Medication is active on med list        Passed - Patient is age 18 or older        Passed - No active pregnancy on record        Passed - No positive pregnancy test in past 12 months        losartan (COZAAR) 100 MG tablet  Last Written Prescription Date:  03/22/2018  Last Fill Quantity: 90 tablet,  # refills: 3   Last office visit: 4/23/2018 with prescribing provider:  NILSA Day   Future Office Visit:      Abril Orozco RT (R) (M)    "

## 2019-04-22 DIAGNOSIS — E03.9 HYPOTHYROIDISM, UNSPECIFIED TYPE: ICD-10-CM

## 2019-04-22 DIAGNOSIS — I10 HYPERTENSION GOAL BP (BLOOD PRESSURE) < 140/90: ICD-10-CM

## 2019-04-22 RX ORDER — AMLODIPINE BESYLATE 10 MG/1
TABLET ORAL
Qty: 30 TABLET | Refills: 0 | Status: SHIPPED | OUTPATIENT
Start: 2019-04-22 | End: 2019-05-20

## 2019-04-22 RX ORDER — LEVOTHYROXINE SODIUM 150 UG/1
TABLET ORAL
Qty: 30 TABLET | Refills: 0 | Status: SHIPPED | OUTPATIENT
Start: 2019-04-22 | End: 2019-05-20 | Stop reason: DRUGHIGH

## 2019-04-22 NOTE — TELEPHONE ENCOUNTER
"Requested Prescriptions   Pending Prescriptions Disp Refills     levothyroxine (SYNTHROID/LEVOTHROID) 150 MCG tablet [Pharmacy Med Name: LEVOTHYROXINE SODIUM 150MCG TABS]  Last Written Prescription Date:  4/24/18  Last Fill Quantity: 90,  # refills: 3   Last office visit: 4/23/2018 with prescribing provider:  Leaf   Future Office Visit:     90 tablet 3     Sig: TAKE ONE TABLET BY MOUTH ONCE DAILY       Thyroid Protocol Failed - 4/22/2019  7:43 AM        Failed - Normal TSH on file in past 12 months     Recent Labs   Lab Test 04/16/18  1300   TSH 2.79              Passed - Patient is 12 years or older        Passed - Recent (12 mo) or future (30 days) visit within the authorizing provider's specialty     Patient had office visit in the last 12 months or has a visit in the next 30 days with authorizing provider or within the authorizing provider's specialty.  See \"Patient Info\" tab in inbasket, or \"Choose Columns\" in Meds & Orders section of the refill encounter.              Passed - Medication is active on med list        Passed - No active pregnancy on record     If patient is pregnant or has had a positive pregnancy test, please check TSH.          Passed - No positive pregnancy test in past 12 months     If patient is pregnant or has had a positive pregnancy test, please check TSH.          amLODIPine (NORVASC) 10 MG tablet [Pharmacy Med Name: AMLODIPINE BESYLATE 10MG TABS]  Last Written Prescription Date:  1/18/19  Last Fill Quantity: 30,  # refills: 2   Last office visit: 4/23/2018 with prescribing provider:  Leaf   Future Office Visit:     30 tablet 2     Sig: TAKE ONE TABLET BY MOUTH ONCE DAILY       Calcium Channel Blockers Protocol  Failed - 4/22/2019  7:43 AM        Failed - Blood pressure under 140/90 in past 12 months     BP Readings from Last 3 Encounters:   04/23/18 153/83   04/16/18 (!) 132/94   03/22/18 144/84                 Failed - Normal serum creatinine on file in past 12 months     Recent Labs " "  Lab Test 02/23/18  0749   CR 0.52             Passed - Recent (12 mo) or future (30 days) visit within the authorizing provider's specialty     Patient had office visit in the last 12 months or has a visit in the next 30 days with authorizing provider or within the authorizing provider's specialty.  See \"Patient Info\" tab in inbasket, or \"Choose Columns\" in Meds & Orders section of the refill encounter.              Passed - Medication is active on med list        Passed - Patient is age 18 or older        Passed - No active pregnancy on record        Passed - No positive pregnancy test in past 12 months          "

## 2019-04-28 DIAGNOSIS — N32.81 OVERACTIVE BLADDER: ICD-10-CM

## 2019-04-28 DIAGNOSIS — E78.5 HYPERLIPIDEMIA LDL GOAL <130: ICD-10-CM

## 2019-04-29 RX ORDER — LOVASTATIN 40 MG
TABLET ORAL
Qty: 30 TABLET | Refills: 0 | Status: SHIPPED | OUTPATIENT
Start: 2019-04-29 | End: 2019-05-20

## 2019-04-29 RX ORDER — OXYBUTYNIN CHLORIDE 5 MG/1
TABLET ORAL
Qty: 90 TABLET | Refills: 0 | Status: SHIPPED | OUTPATIENT
Start: 2019-04-29 | End: 2019-05-20

## 2019-04-29 NOTE — TELEPHONE ENCOUNTER
"Requested Prescriptions   Pending Prescriptions Disp Refills     lovastatin (MEVACOR) 40 MG tablet [Pharmacy Med Name: LOVASTATIN 40MG TABS] 90 tablet 0     Sig: TAKE ONE TABLET BY MOUTH AT BEDTIME       Statins Protocol Failed - 4/28/2019  7:50 AM        Failed - LDL on file in past 12 months     Recent Labs   Lab Test 02/23/18  0749   LDL 92             Failed - Recent (12 mo) or future (30 days) visit within the authorizing provider's specialty     Patient had office visit in the last 12 months or has a visit in the next 30 days with authorizing provider or within the authorizing provider's specialty.  See \"Patient Info\" tab in inbasket, or \"Choose Columns\" in Meds & Orders section of the refill encounter.              Passed - No abnormal creatine kinase in past 12 months     No lab results found.             Passed - Medication is active on med list        Passed - Patient is age 18 or older        Passed - No active pregnancy on record        Passed - No positive pregnancy test in past 12 months        oxybutynin (DITROPAN) 5 MG tablet [Pharmacy Med Name: OXYBUTYNIN CHLORIDE 5MG TABS] 270 tablet 3     Sig: TAKE 1 TABLET BY MOUTH THREE TIMES A DAY       Muscarinic Antagonists (Urinary Incontinence Agents) Failed - 4/28/2019  7:50 AM        Failed - Recent (12 mo) or future (30 days) visit within the authorizing provider's specialty     Patient had office visit in the last 12 months or has a visit in the next 30 days with authorizing provider or within the authorizing provider's specialty.  See \"Patient Info\" tab in inInspiratoet, or \"Choose Columns\" in Meds & Orders section of the refill encounter.              Passed - Medication is Oxybutynin and patient is 5 years of age or older        Passed - Patient does not have a diagnosis of glaucoma on the problem list     If glaucoma diagnosis is new, refer refill to physician.          Passed - Medication is active on med list        Passed - Patient is 18 years of age " or older        Last Written Prescription Date:  2/1/19  Last Fill Quantity: 90,  # refills: 0   Last office visit: 4/23/2018 with prescribing provider:  Rajat Day Office Visit:

## 2019-05-03 ENCOUNTER — TELEPHONE (OUTPATIENT)
Dept: FAMILY MEDICINE | Facility: CLINIC | Age: 69
End: 2019-05-03

## 2019-05-03 DIAGNOSIS — I10 HYPERTENSION GOAL BP (BLOOD PRESSURE) < 140/90: ICD-10-CM

## 2019-05-03 DIAGNOSIS — E78.5 HYPERLIPIDEMIA LDL GOAL <130: ICD-10-CM

## 2019-05-03 DIAGNOSIS — E03.9 HYPOTHYROIDISM, UNSPECIFIED TYPE: Primary | ICD-10-CM

## 2019-05-03 NOTE — TELEPHONE ENCOUNTER
Reason for Call:  Other     Detailed comments: Patient is wanting to establish care with Dr. Sharma - She is a friend of Lauren Wilbert.  She would like to do labs prior to appt and get all her meds refilled at the appt. - Please call pt    Phone Number Patient can be reached at: Home number on file [phone] (home)    Best Time:     Can we leave a detailed message on this number? YES    Call taken on 5/3/2019 at 8:47 AM by Anu Archuleta

## 2019-05-06 ENCOUNTER — ANCILLARY PROCEDURE (OUTPATIENT)
Dept: MAMMOGRAPHY | Facility: CLINIC | Age: 69
End: 2019-05-06
Attending: FAMILY MEDICINE
Payer: COMMERCIAL

## 2019-05-06 DIAGNOSIS — Z12.31 VISIT FOR SCREENING MAMMOGRAM: ICD-10-CM

## 2019-05-06 PROCEDURE — 77067 SCR MAMMO BI INCL CAD: CPT | Mod: TC | Performed by: FAMILY MEDICINE

## 2019-05-15 DIAGNOSIS — E78.5 HYPERLIPIDEMIA LDL GOAL <130: ICD-10-CM

## 2019-05-15 DIAGNOSIS — I10 HYPERTENSION GOAL BP (BLOOD PRESSURE) < 140/90: ICD-10-CM

## 2019-05-15 DIAGNOSIS — E03.9 HYPOTHYROIDISM, UNSPECIFIED TYPE: ICD-10-CM

## 2019-05-15 LAB
ALBUMIN SERPL-MCNC: 3.5 G/DL (ref 3.4–5)
ALP SERPL-CCNC: 115 U/L (ref 40–150)
ALT SERPL W P-5'-P-CCNC: 27 U/L (ref 0–50)
ANION GAP SERPL CALCULATED.3IONS-SCNC: 3 MMOL/L (ref 3–14)
AST SERPL W P-5'-P-CCNC: 15 U/L (ref 0–45)
BILIRUB SERPL-MCNC: 0.5 MG/DL (ref 0.2–1.3)
BUN SERPL-MCNC: 14 MG/DL (ref 7–30)
CALCIUM SERPL-MCNC: 9 MG/DL (ref 8.5–10.1)
CHLORIDE SERPL-SCNC: 107 MMOL/L (ref 94–109)
CHOLEST SERPL-MCNC: 164 MG/DL
CO2 SERPL-SCNC: 28 MMOL/L (ref 20–32)
CREAT SERPL-MCNC: 0.58 MG/DL (ref 0.52–1.04)
ERYTHROCYTE [DISTWIDTH] IN BLOOD BY AUTOMATED COUNT: 14.3 % (ref 10–15)
GFR SERPL CREATININE-BSD FRML MDRD: >90 ML/MIN/{1.73_M2}
GLUCOSE SERPL-MCNC: 122 MG/DL (ref 70–99)
HCT VFR BLD AUTO: 44.4 % (ref 35–47)
HDLC SERPL-MCNC: 60 MG/DL
HGB BLD-MCNC: 14.5 G/DL (ref 11.7–15.7)
LDLC SERPL CALC-MCNC: 87 MG/DL
MCH RBC QN AUTO: 29.5 PG (ref 26.5–33)
MCHC RBC AUTO-ENTMCNC: 32.7 G/DL (ref 31.5–36.5)
MCV RBC AUTO: 90 FL (ref 78–100)
NONHDLC SERPL-MCNC: 104 MG/DL
PLATELET # BLD AUTO: 264 10E9/L (ref 150–450)
POTASSIUM SERPL-SCNC: 3.6 MMOL/L (ref 3.4–5.3)
PROT SERPL-MCNC: 7.4 G/DL (ref 6.8–8.8)
RBC # BLD AUTO: 4.91 10E12/L (ref 3.8–5.2)
SODIUM SERPL-SCNC: 138 MMOL/L (ref 133–144)
TRIGL SERPL-MCNC: 84 MG/DL
TSH SERPL DL<=0.005 MIU/L-ACNC: 3.48 MU/L (ref 0.4–4)
WBC # BLD AUTO: 7.4 10E9/L (ref 4–11)

## 2019-05-15 PROCEDURE — 36415 COLL VENOUS BLD VENIPUNCTURE: CPT | Performed by: FAMILY MEDICINE

## 2019-05-15 PROCEDURE — 80061 LIPID PANEL: CPT | Performed by: FAMILY MEDICINE

## 2019-05-15 PROCEDURE — 80053 COMPREHEN METABOLIC PANEL: CPT | Performed by: FAMILY MEDICINE

## 2019-05-15 PROCEDURE — 84443 ASSAY THYROID STIM HORMONE: CPT | Performed by: FAMILY MEDICINE

## 2019-05-15 PROCEDURE — 85027 COMPLETE CBC AUTOMATED: CPT | Performed by: FAMILY MEDICINE

## 2019-05-16 NOTE — RESULT ENCOUNTER NOTE
"Please call.  TSH borderline abnormal suggesting she could use a little bit more levothyroxine assuming she is taking daily on a regular basis and not missing doses.   Lipid tests including total cholesterol, triglycerides, HDL (\"good cholesterol\") and LDL (\"bad cholesterol\") are normal.    PLAN: Increase levothyroxine to 175mcg daily.  REcheck TSH in 2 months.   Please arrange for prescriptions and orders.   Rx #30 with 2 refills.   "

## 2019-05-17 ENCOUNTER — TELEPHONE (OUTPATIENT)
Dept: FAMILY MEDICINE | Facility: CLINIC | Age: 69
End: 2019-05-17

## 2019-05-17 DIAGNOSIS — E03.9 HYPOTHYROIDISM, UNSPECIFIED TYPE: Primary | ICD-10-CM

## 2019-05-17 RX ORDER — LEVOTHYROXINE SODIUM 175 UG/1
175 TABLET ORAL DAILY
Qty: 30 TABLET | Refills: 2 | Status: SHIPPED | OUTPATIENT
Start: 2019-05-17 | End: 2019-08-09

## 2019-05-17 NOTE — TELEPHONE ENCOUNTER
PLAN: Increase levothyroxine to 175mcg daily.  REcheck TSH in 2 months.   Please arrange for prescriptions and orders.   Rx #30 with 2 refills.     Patient notified by phone of plan.   RN please sign the orders.

## 2019-05-20 ENCOUNTER — OFFICE VISIT (OUTPATIENT)
Dept: FAMILY MEDICINE | Facility: CLINIC | Age: 69
End: 2019-05-20
Payer: COMMERCIAL

## 2019-05-20 VITALS
HEIGHT: 66 IN | OXYGEN SATURATION: 97 % | SYSTOLIC BLOOD PRESSURE: 118 MMHG | WEIGHT: 258 LBS | DIASTOLIC BLOOD PRESSURE: 88 MMHG | RESPIRATION RATE: 12 BRPM | TEMPERATURE: 98.3 F | BODY MASS INDEX: 41.46 KG/M2 | HEART RATE: 72 BPM

## 2019-05-20 DIAGNOSIS — E78.5 HYPERLIPIDEMIA LDL GOAL <130: ICD-10-CM

## 2019-05-20 DIAGNOSIS — N39.46 MIXED STRESS AND URGE URINARY INCONTINENCE: ICD-10-CM

## 2019-05-20 DIAGNOSIS — E03.9 HYPOTHYROIDISM, UNSPECIFIED TYPE: ICD-10-CM

## 2019-05-20 DIAGNOSIS — I10 HYPERTENSION GOAL BP (BLOOD PRESSURE) < 140/90: Primary | ICD-10-CM

## 2019-05-20 DIAGNOSIS — E66.01 MORBID OBESITY (H): ICD-10-CM

## 2019-05-20 DIAGNOSIS — R73.09 ELEVATED GLUCOSE: ICD-10-CM

## 2019-05-20 DIAGNOSIS — N32.81 OVERACTIVE BLADDER: ICD-10-CM

## 2019-05-20 DIAGNOSIS — M15.0 PRIMARY OSTEOARTHRITIS INVOLVING MULTIPLE JOINTS: ICD-10-CM

## 2019-05-20 DIAGNOSIS — I49.9 IRREGULAR HEART BEAT: ICD-10-CM

## 2019-05-20 DIAGNOSIS — R01.1 HEART MURMUR: ICD-10-CM

## 2019-05-20 DIAGNOSIS — R12 HEARTBURN: ICD-10-CM

## 2019-05-20 DIAGNOSIS — Z12.11 SPECIAL SCREENING FOR MALIGNANT NEOPLASMS, COLON: ICD-10-CM

## 2019-05-20 PROCEDURE — 99215 OFFICE O/P EST HI 40 MIN: CPT | Performed by: FAMILY MEDICINE

## 2019-05-20 PROCEDURE — 93000 ELECTROCARDIOGRAM COMPLETE: CPT | Performed by: FAMILY MEDICINE

## 2019-05-20 RX ORDER — AMLODIPINE BESYLATE 10 MG/1
10 TABLET ORAL DAILY
Qty: 90 TABLET | Refills: 3 | Status: SHIPPED | OUTPATIENT
Start: 2019-05-20 | End: 2020-05-22

## 2019-05-20 RX ORDER — NITROGLYCERIN 0.4 MG/1
0.4 TABLET SUBLINGUAL EVERY 5 MIN PRN
Qty: 25 TABLET | Refills: 1 | Status: CANCELLED | OUTPATIENT
Start: 2019-05-20

## 2019-05-20 RX ORDER — MIRABEGRON 25 MG/1
25 TABLET, FILM COATED, EXTENDED RELEASE ORAL DAILY
Qty: 30 TABLET | Refills: 3 | Status: SHIPPED | OUTPATIENT
Start: 2019-05-20 | End: 2019-06-06

## 2019-05-20 RX ORDER — LOSARTAN POTASSIUM 100 MG/1
100 TABLET ORAL DAILY
Qty: 90 TABLET | Refills: 3 | Status: SHIPPED | OUTPATIENT
Start: 2019-05-20 | End: 2020-07-02

## 2019-05-20 RX ORDER — MELOXICAM 15 MG/1
7.5 TABLET ORAL 2 TIMES DAILY PRN
Qty: 90 TABLET | Refills: 3 | Status: SHIPPED | OUTPATIENT
Start: 2019-05-20 | End: 2019-10-10

## 2019-05-20 RX ORDER — LOVASTATIN 40 MG
TABLET ORAL
Qty: 90 TABLET | Refills: 3 | Status: SHIPPED | OUTPATIENT
Start: 2019-05-20 | End: 2020-05-22

## 2019-05-20 ASSESSMENT — PAIN SCALES - GENERAL: PAINLEVEL: NO PAIN (0)

## 2019-05-20 ASSESSMENT — MIFFLIN-ST. JEOR: SCORE: 1722.03

## 2019-05-20 NOTE — NURSING NOTE
"Chief Complaint   Patient presents with     Establish Care     go over labs       Initial /88   Pulse 72   Temp 98.3  F (36.8  C) (Tympanic)   Resp 12   Ht 1.676 m (5' 6\")   Wt 117 kg (258 lb)   SpO2 97%   BMI 41.64 kg/m   Estimated body mass index is 41.64 kg/m  as calculated from the following:    Height as of this encounter: 1.676 m (5' 6\").    Weight as of this encounter: 117 kg (258 lb).    Patient presents to the clinic using No DME    Health Maintenance that is potentially due pending provider review:  Colonoscopy/FIT, Dexa, Pneumovax 23    Gave pt phone number/pended order to schedule mammo and/or colonoscopy(or FIT) FIT test given to pt    Is there anyone who you would like to be able to receive your results? No  If yes have patient fill out LEANA      "

## 2019-05-20 NOTE — PROGRESS NOTES
Subjective     Audelia Aceves is a 69 year old female who presents to clinic today for the following health issues:    HPI   New Patient/Transfer of Care    Hypertension Follow-up      Do you check your blood pressure regularly outside of the clinic? No Occasionally checks at home    Are you following a low salt diet? Yes    Are your blood pressures ever more than 140 on the top number (systolic) OR more   than 90 on the bottom number (diastolic), for example 140/90? Yes    On losartan, Amlodipine   BP Readings from Last 6 Encounters:   05/20/19 118/88   04/23/18 153/83   04/16/18 (!) 132/94   03/22/18 144/84   02/28/18 144/90   12/07/17 136/86        Hyperlipidemia-on lovastatin  Recent Labs   Lab Test 05/15/19  0819 02/23/18  0749  08/26/15  0924 08/21/14  0830   CHOL 164 168   < > 183 172   HDL 60 53   < > 63 57   LDL 87 92   < > 99 97   TRIG 84 113   < > 105 92   CHOLHDLRATIO  --   --   --  2.9 3.0    < > = values in this interval not displayed.      Hypothyroidism-on levothyroxine   Had her dose changed by Dr. Choi last week.  TSH   Date Value Ref Range Status   05/15/2019 3.48 0.40 - 4.00 mU/L Final      Overactive bladder-patient is on oxybutynin, recently filled. Makes her very dry. Has urge and stress incontinence. Still has to get up to go the bathroom during the night 3 times. Drinks a lot of water because her mouth is so dry.     gastroesophageal reflux disease-on omeprazole, Doing well. Gets terrible acid in her mouth when not on it.     osteoarthritis-she is on meloxicam, 1/2 tab bid. Has chronic knee, hip and hand pain. That helps a lot.   Creatinine   Date Value Ref Range Status   05/15/2019 0.58 0.52 - 1.04 mg/dL Final     Colon polyp-she had a colonoscopy in 2006 and did have a polyp.  It was hyperplastic.  She wonders if she can do a fit test or should do a colonoscopy.    BP Readings from Last 3 Encounters:   05/20/19 118/88   04/23/18 153/83   04/16/18 (!) 132/94    Wt Readings from Last 3  "Encounters:   05/20/19 117 kg (258 lb)   04/23/18 114.1 kg (251 lb 9.6 oz)   02/28/18 112.9 kg (249 lb)              Reviewed and updated as needed this visit by Provider  Tobacco  Allergies  Meds  Problems  Med Hx  Surg Hx  Fam Hx         Review of Systems   ROS COMP: Constitutional, HEENT, cardiovascular, pulmonary, gi and gu systems are negative, except as otherwise noted.      Objective    /88   Pulse 72   Temp 98.3  F (36.8  C) (Tympanic)   Resp 12   Ht 1.676 m (5' 6\")   Wt 117 kg (258 lb)   SpO2 97%   BMI 41.64 kg/m    Body mass index is 41.64 kg/m .  Physical Exam   General: appears well, no distress  HEENT: oropharynx with no erythema, no exudate  Neck: supple, no adenopathy  Heart: regular rate and rhythm, normal S1S2, 2/6 .systolic ejection murmur  Lungs: clear to ascultation   Abd: soft, nontender, no HSM, no mass or distention   Ext: no edema      ASSESSMENT:  1. Hypertension goal BP (blood pressure) < 140/90    2. Hyperlipidemia LDL goal <130    3. Heartburn    4. Overactive bladder    5. Special screening for malignant neoplasms, colon    6. Elevated glucose    7. Morbid obesity (H)    8. Irregular heart beat    9. Heart murmur    10. Hypothyroidism, unspecified type    11. Mixed stress and urge urinary incontinence    12. Primary osteoarthritis involving multiple joints        PLAN:  Orders Placed This Encounter     Hemoglobin A1c     PHYSICAL THERAPY REFERRAL     GASTROENTEROLOGY ADULT REF PROCEDURE ONLY [facility] [phone]     meloxicam (MOBIC) 15 MG tablet     amLODIPine (NORVASC) 10 MG tablet     losartan (COZAAR) 100 MG tablet     lovastatin (MEVACOR) 40 MG tablet     omeprazole (PRILOSEC) 20 MG DR capsule     mirabegron (MYRBETRIQ) 25 MG 24 hr tablet     See below  40 min spent with patient, greater than 50% in counseling and coordination of care and discussion of multiple issues today.  I would do a colonoscopy as she is a polyp former.  I noted an irregular " heartbeat today, EKG just showed numerous PVCs.  I am going to get an echocardiogram as I also heard a murmur.  Would consider ZIO patch.    Patient Instructions   Let's switch to a different medication to help the bladder, mirabegron, once a day. Stop the oxybutynin.     Physical therapy for pelvic floor    Try the omeprazole every other day, doesn't work, ok to go to every day again    Colonoscopy     Echocardiogram [phone]    See you back in a month     Anu More MD

## 2019-05-20 NOTE — PATIENT INSTRUCTIONS
Let's switch to a different medication to help the bladder, mirabegron, once a day. Stop the oxybutynin.     Physical therapy for pelvic floor    Try the omeprazole every other day, doesn't work, ok to go to every day again    Colonoscopy     Echocardiogram [phone]    See you back in a month

## 2019-05-21 PROBLEM — R12 HEARTBURN: Status: ACTIVE | Noted: 2019-05-21

## 2019-05-21 PROBLEM — N39.46 MIXED STRESS AND URGE URINARY INCONTINENCE: Status: ACTIVE | Noted: 2019-05-21

## 2019-05-21 PROBLEM — M15.0 PRIMARY OSTEOARTHRITIS INVOLVING MULTIPLE JOINTS: Status: ACTIVE | Noted: 2019-05-21

## 2019-05-21 PROBLEM — R73.09 ELEVATED GLUCOSE: Status: ACTIVE | Noted: 2019-05-21

## 2019-05-21 PROBLEM — N32.81 OVERACTIVE BLADDER: Status: ACTIVE | Noted: 2019-05-21

## 2019-05-21 PROBLEM — I49.9 IRREGULAR HEART BEAT: Status: ACTIVE | Noted: 2019-05-21

## 2019-05-23 ENCOUNTER — TELEPHONE (OUTPATIENT)
Dept: FAMILY MEDICINE | Facility: CLINIC | Age: 69
End: 2019-05-23

## 2019-05-23 DIAGNOSIS — N32.81 OVERACTIVE BLADDER: Primary | ICD-10-CM

## 2019-05-23 RX ORDER — SOLIFENACIN SUCCINATE 10 MG/1
10 TABLET, FILM COATED ORAL DAILY
Qty: 31 TABLET | Refills: 5 | Status: SHIPPED | OUTPATIENT
Start: 2019-05-23 | End: 2019-06-17

## 2019-05-23 NOTE — TELEPHONE ENCOUNTER
Patient's chart has been reviewed.  Oxybutynin ineffective  Mirabegron cost issue  Prescription for Vesicare 10 mg daily sent to the pharmacy  May need prior authorization  To review with primary care provider upon return  Thanks Lauren MCCLAIN-BC

## 2019-05-23 NOTE — TELEPHONE ENCOUNTER
Was on Oxybutin this was discharge on 5-20-19 by Dr Sharma. Mirabegron was started but pt states it is too expensive.  Also PT was ordered. Can something else be tried? She Moeller RN

## 2019-05-23 NOTE — TELEPHONE ENCOUNTER
Audelia is calling because the medication Myrbetriq for her overactive bladder is too expensive.  Dr. Desai told her to call in if she had any problems.  She uses [facility] in [place].    [facility]

## 2019-05-24 NOTE — TELEPHONE ENCOUNTER
Called pt and relayed message below.  Encouraged her to call back with any issues.    Svitlana SWANSON RN

## 2019-05-28 ENCOUNTER — HOSPITAL ENCOUNTER (OUTPATIENT)
Dept: CARDIOLOGY | Facility: CLINIC | Age: 69
Discharge: HOME | End: 2019-05-28
Attending: FAMILY MEDICINE | Admitting: FAMILY MEDICINE
Payer: MEDICARE

## 2019-05-28 DIAGNOSIS — R01.1 HEART MURMUR: ICD-10-CM

## 2019-05-28 PROCEDURE — 93306 TTE W/DOPPLER COMPLETE: CPT

## 2019-05-28 PROCEDURE — 93306 TTE W/DOPPLER COMPLETE: CPT | Mod: 26 | Performed by: INTERNAL MEDICINE

## 2019-06-07 ENCOUNTER — TELEPHONE (OUTPATIENT)
Dept: FAMILY MEDICINE | Facility: CLINIC | Age: 69
End: 2019-06-07

## 2019-06-07 DIAGNOSIS — N32.81 OVERACTIVE BLADDER: ICD-10-CM

## 2019-06-07 NOTE — TELEPHONE ENCOUNTER
Spoke with pt in regards to result note. Pt had further questions.     1.) She has colonoscopy scheduled for Tuesday 6/11/19. Is wondering if she needs to stop Aspirin 81 mg. Surgery center told her to contact PCP.     2.) The bladder medication she was prescribed is too expensive wondering if there is anything else that is cheaper that she can take.     Will route to Dr. Desai to advise.     Lesley Bailey MA  4:24 PM 6/7/2019

## 2019-06-10 ENCOUNTER — ANESTHESIA EVENT (OUTPATIENT)
Dept: GASTROENTEROLOGY | Facility: CLINIC | Age: 69
End: 2019-06-10
Payer: MEDICARE

## 2019-06-10 NOTE — ANESTHESIA PREPROCEDURE EVALUATION
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     Endometrial polyp 2009     Major depression in complete remission (H) 3/8/2011     Sebaceous cyst 3/26/2010     Thyrotoxicosis without mention of goiter or other cause, without mention of thyrotoxic crisis or storm      Past Surgical History:   Procedure Laterality Date     ARTHROPLASTY TOE(S)  2011    Procedure:ARTHROPLASTY TOE(S); Subtalar implant-[facility] Procedure; Surgeon:LUIS DANIEL GRIDER; Location:[facility] OR     ARTHROSCOPY KNEE RT/LT  2009    left knee meniscal tear     COLONOSCOPY  10/13/06    1 [id number]-[id number]-[id number] in 10 yrs     LENGTHEN TENDON ACHILLES  2011    Procedure:LENGTHEN TENDON ACHILLES; Tendon transfer-Anes. consult-block       SURGICAL HISTORY OF -   1982    Tubal ligation     SURGICAL HISTORY OF -       uterine polyp removal     SURGICAL HISTORY OF -       left foot surgery for bunionette       Anesthesia Evaluation     . Pt has had prior anesthetic. Type: General and MAC           ROS/MED HX    ENT/Pulmonary:  - neg pulmonary ROS     Neurologic:  - neg neurologic ROS     Cardiovascular:     (+) Dyslipidemia, hypertension--CAD, angina--. : . . . :. .       METS/Exercise Tolerance:     Hematologic:         Musculoskeletal:   (+) arthritis,  -       GI/Hepatic:  - neg GI/hepatic ROS       Renal/Genitourinary:         Endo:     (+) thyroid problem hypothyroidism, Obesity, .      Psychiatric:     (+) psychiatric history depression      Infectious Disease:         Malignancy:      - no malignancy   Other:    - neg other ROS                      Physical Exam  Normal systems: cardiovascular, pulmonary and dental    Airway   Mallampati: II  TM distance: >3  "FB  Neck ROM: full    Dental     Cardiovascular       Pulmonary             Lab Results   Component Value Date    WBC 7.4 05/15/2019    HGB 14.5 05/15/2019    HCT 44.4 05/15/2019     05/15/2019     05/15/2019    POTASSIUM 3.6 05/15/2019    CHLORIDE 107 05/15/2019    CO2 28 05/15/2019    BUN 14 05/15/2019    CR 0.58 05/15/2019     (H) 05/15/2019    KERRY 9.0 05/15/2019    ALBUMIN 3.5 05/15/2019    PROTTOTAL 7.4 05/15/2019    ALT 27 05/15/2019    AST 15 05/15/2019    ALKPHOS 115 05/15/2019    BILITOTAL 0.5 05/15/2019    TSH 3.48 05/15/2019    T4 1.39 12/16/2016    T3 63 06/15/2005       Preop Vitals  BP Readings from Last 3 Encounters:   05/20/19 118/88   04/23/18 153/83   04/16/18 (!) 132/94    Pulse Readings from Last 3 Encounters:   05/20/19 72   04/23/18 62   04/16/18 64      Resp Readings from Last 3 Encounters:   05/20/19 12   04/23/18 16   02/28/18 18    SpO2 Readings from Last 3 Encounters:   05/20/19 97%   01/13/16 97%   07/11/13 97%      Temp Readings from Last 1 Encounters:   05/20/19 36.8  C (98.3  F) (Tympanic)    Ht Readings from Last 1 Encounters:   05/20/19 1.676 m (5' 6\")      Wt Readings from Last 1 Encounters:   05/20/19 117 kg (258 lb)    Estimated body mass index is 41.64 kg/m  as calculated from the following:    Height as of 5/20/19: 1.676 m (5' 6\").    Weight as of 5/20/19: 117 kg (258 lb).       Anesthesia Plan      History & Physical Review  History and physical reviewed and following examination; no interval change.    ASA Status:  3 .    NPO Status:  > 4 hours    Plan for MAC with Intravenous and Propofol induction. Reason for MAC:  Other - see comments  PONV prophylaxis:  Ondansetron (or other 5HT-3) and Dexamethasone or Solumedrol       Postoperative Care  Postoperative pain management:  IV analgesics and Oral pain medications.      Consents  Anesthetic plan, risks, benefits and alternatives discussed with:  Patient..                 Stephanie Spear CRNA, APRN " CRNA

## 2019-06-11 ENCOUNTER — ANESTHESIA (OUTPATIENT)
Dept: GASTROENTEROLOGY | Facility: CLINIC | Age: 69
End: 2019-06-11
Payer: MEDICARE

## 2019-06-11 ENCOUNTER — HOSPITAL ENCOUNTER (OUTPATIENT)
Facility: CLINIC | Age: 69
Discharge: HOME | End: 2019-06-11
Attending: SURGERY | Admitting: SURGERY
Payer: MEDICARE

## 2019-06-11 VITALS
RESPIRATION RATE: 18 BRPM | DIASTOLIC BLOOD PRESSURE: 94 MMHG | SYSTOLIC BLOOD PRESSURE: 155 MMHG | WEIGHT: 258 LBS | OXYGEN SATURATION: 98 % | BODY MASS INDEX: 41.46 KG/M2 | TEMPERATURE: 98.3 F | HEART RATE: 68 BPM | HEIGHT: 66 IN

## 2019-06-11 LAB — COLONOSCOPY: NORMAL

## 2019-06-11 PROCEDURE — 25800030 ZZH RX IP 258 OP 636: Performed by: SURGERY

## 2019-06-11 PROCEDURE — 25000128 H RX IP 250 OP 636: Performed by: NURSE ANESTHETIST, CERTIFIED REGISTERED

## 2019-06-11 PROCEDURE — G0121 COLON CA SCRN NOT HI RSK IND: HCPCS | Performed by: SURGERY

## 2019-06-11 PROCEDURE — 45378 DIAGNOSTIC COLONOSCOPY: CPT | Performed by: SURGERY

## 2019-06-11 PROCEDURE — 25000125 ZZHC RX 250: Performed by: SURGERY

## 2019-06-11 PROCEDURE — 37000008 ZZH ANESTHESIA TECHNICAL FEE, 1ST 30 MIN: Performed by: SURGERY

## 2019-06-11 RX ORDER — LIDOCAINE 40 MG/G
CREAM TOPICAL
Status: DISCONTINUED | OUTPATIENT
Start: 2019-06-11 | End: 2019-06-11 | Stop reason: HOSPADM

## 2019-06-11 RX ORDER — PROPOFOL 10 MG/ML
INJECTION, EMULSION INTRAVENOUS CONTINUOUS PRN
Status: DISCONTINUED | OUTPATIENT
Start: 2019-06-11 | End: 2019-06-11

## 2019-06-11 RX ORDER — ONDANSETRON 2 MG/ML
4 INJECTION INTRAMUSCULAR; INTRAVENOUS
Status: DISCONTINUED | OUTPATIENT
Start: 2019-06-11 | End: 2019-06-11 | Stop reason: HOSPADM

## 2019-06-11 RX ORDER — PROPOFOL 10 MG/ML
INJECTION, EMULSION INTRAVENOUS PRN
Status: DISCONTINUED | OUTPATIENT
Start: 2019-06-11 | End: 2019-06-11

## 2019-06-11 RX ORDER — SODIUM CHLORIDE, SODIUM LACTATE, POTASSIUM CHLORIDE, CALCIUM CHLORIDE 600; 310; 30; 20 MG/100ML; MG/100ML; MG/100ML; MG/100ML
INJECTION, SOLUTION INTRAVENOUS CONTINUOUS
Status: DISCONTINUED | OUTPATIENT
Start: 2019-06-11 | End: 2019-06-11 | Stop reason: HOSPADM

## 2019-06-11 RX ADMIN — PROPOFOL 50 MG: 10 INJECTION, EMULSION INTRAVENOUS at 09:44

## 2019-06-11 RX ADMIN — LIDOCAINE HYDROCHLORIDE 1 ML: 10 INJECTION, SOLUTION EPIDURAL; INFILTRATION; INTRACAUDAL; PERINEURAL at 09:16

## 2019-06-11 RX ADMIN — SODIUM CHLORIDE, POTASSIUM CHLORIDE, SODIUM LACTATE AND CALCIUM CHLORIDE: 600; 310; 30; 20 INJECTION, SOLUTION INTRAVENOUS at 09:16

## 2019-06-11 RX ADMIN — PROPOFOL 200 MCG/KG/MIN: 10 INJECTION, EMULSION INTRAVENOUS at 09:39

## 2019-06-11 ASSESSMENT — MIFFLIN-ST. JEOR: SCORE: 1722.03

## 2019-06-11 NOTE — ANESTHESIA CARE TRANSFER NOTE
Patient: Audelia Aceves    Procedure(s):  COLONOSCOPY    Diagnosis: screening  Diagnosis Additional Information: No value filed.    Anesthesia Type:   MAC     Note:  Airway :Nasal Cannula  Patient transferred to:Phase II        Vitals: (Last set prior to Anesthesia Care Transfer)    CRNA VITALS  6/11/2019 0930 - 6/11/2019 1000      6/11/2019             Pulse:  66    SpO2:  98 %                Electronically Signed By: TANA Desir CRNA  June 11, 2019  10:00 AM

## 2019-06-11 NOTE — BRIEF OP NOTE
[facility]   Brief Operative Note    Pre-operative diagnosis: screening   Post-operative diagnosis scattered diverticulosis, otherwise normal     Procedure: Procedure(s):  COLONOSCOPY   Surgeon(s): Surgeon(s) and Role:     * Rylan Montano MD - Primary   Estimated blood loss: * No values recorded between 6/11/2019 12:00 AM and 6/11/2019 10:01 AM *    Specimens: * No specimens in log *   Findings: 1. Scattered sigmoid diverticulosis  2. No polyps or tumors seen  3. Colon otherwise normal

## 2019-06-11 NOTE — H&P
"69 year old year old female here for colonoscopy for screening.    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     Endometrial polyp 7/31/2009     Major depression in complete remission (H) 3/8/2011     Sebaceous cyst 3/26/2010     Thyrotoxicosis without mention of goiter or other cause, without mention of thyrotoxic crisis or storm        Past Surgical History:   Procedure Laterality Date     ARTHROPLASTY TOE(S)  5/27/2011    Procedure:ARTHROPLASTY TOE(S); Subtalar implant-[facility] Procedure; Surgeon:LUIS DANIEL GRIDER; Location:[facility] OR     ARTHROSCOPY KNEE RT/LT  6-    left knee meniscal tear     COLONOSCOPY  10/13/06    1 xahdf-lhesoo-irhkqg in 10 yrs     LENGTHEN TENDON ACHILLES  5/27/2011    Procedure:LENGTHEN TENDON ACHILLES; Tendon transfer-Anes. consult-block       SURGICAL HISTORY OF -   1982    Tubal ligation     SURGICAL HISTORY OF -       uterine polyp removal     SURGICAL HISTORY OF -       left foot surgery for yessenia       @[facility]@    No current outpatient medications on file.       Allergies   Allergen Reactions     Lisinopril Cough     Sulfa Drugs Rash       Pt reports that she has never smoked. She has never used smokeless tobacco. She reports that she drinks alcohol. She reports that she does not use drugs.    Exam:  BP (!) 179/101   Temp 98.3  F (36.8  C) (Oral)   Resp 16   Ht 1.676 m (5' 6\")   Wt 117 kg (258 lb)   " SpO2 98%   BMI 41.64 kg/m      Awake, Alert OX3  Lungs - CTA bilaterally  CV - RRR, no murmurs, distal pulses intact  Abd - soft, non-distended, non-tender, +BS  Extr - No cyanosis or edema    A/P 69 year old year old female in need of colonoscopy for screening. Risks, benefits, alternatives, and complications were discussed including the possibility of perforation and the patient agreed to proceed    Rylan Montano MD

## 2019-06-11 NOTE — ANESTHESIA POSTPROCEDURE EVALUATION
Patient: Audelia Aceves    Procedure(s):  COLONOSCOPY    Diagnosis:screening  Diagnosis Additional Information: No value filed.    Anesthesia Type:  MAC    Note:  Anesthesia Post Evaluation    Patient location during evaluation: Bedside  Patient participation: Able to fully participate in evaluation  Level of consciousness: awake  Pain management: adequate  Airway patency: patent  Cardiovascular status: acceptable  Respiratory status: acceptable  Hydration status: stable  PONV: none     Anesthetic complications: None          Last vitals:  Vitals:    06/11/19 0844   BP: (!) 179/101   Resp: 16   Temp: 36.8  C (98.3  F)   SpO2: 98%         Electronically Signed By: TANA Desir CRNA  June 11, 2019  10:01 AM

## 2019-06-12 ENCOUNTER — HOSPITAL ENCOUNTER (OUTPATIENT)
Dept: PHYSICAL THERAPY | Facility: CLINIC | Age: 69
Setting detail: THERAPIES SERIES
End: 2019-06-12
Attending: FAMILY MEDICINE
Payer: MEDICARE

## 2019-06-12 PROCEDURE — 97162 PT EVAL MOD COMPLEX 30 MIN: CPT | Mod: GP | Performed by: PHYSICAL THERAPIST

## 2019-06-12 PROCEDURE — 97110 THERAPEUTIC EXERCISES: CPT | Mod: GP | Performed by: PHYSICAL THERAPIST

## 2019-06-12 PROCEDURE — 97535 SELF CARE MNGMENT TRAINING: CPT | Mod: GP | Performed by: PHYSICAL THERAPIST

## 2019-06-12 NOTE — PROGRESS NOTES
Physical Therapy Initial Pelvic Floor Muscle Evaluation     06/12/19 0900   General Information   Type of Visit Initial OP Ortho PT Evaluation   Start of Care Date 06/12/19   Referring Physician Anu Desai MD   Patient/Family Goals Statement Get rid relief   Orders Evaluate and Treat   Date of Order 05/27/19   Certification Required? Yes   Medical Diagnosis Overactive bladder   Surgical/Medical history reviewed Yes   Precautions/Limitations no known precautions/limitations   General Information Comments PMHx: HTN, Bladder Control, Menopause, Arthritis, meniscus tear, ankle repair, extra bone removal at foot   Body Part(s)   Body Part(s) Pelvic Floor Dysfunction   Presentation and Etiology   Pertinent history of current problem (include personal factors and/or comorbidities that impact the POC) Pt states she has had 2+ yr h/o bladder control issues.  States has worsened over the years.  She has been on oxybutinin for the last 2 yrs, but this has great side effects and is expensive and she is changing meds.  Mentioned this to MD and was referred to PT for PFM rehab.    Impairments P. Bowel or bladder problems   Functional Limitations perform activities of daily living;perform desired leisure / sports activities   Symptom Location Pelvic Floor Muscle   How/Where did it occur From insidious onset   Onset date of current episode/exacerbation 05/27/19   Chronicity Chronic   Frequency of pain/symptoms A. Constant   Pain/symptoms are: The same all the time   Pain/symptoms exacerbated by M. Other   Pain exacerbation comment cough, sneeze, laugh, startles, jumps   Pain/symptoms eased by D. Nothing   Progression of symptoms since onset: Worsened   Prior Level of Function   Functional Level Prior Comment Prior to 2 yrs ago, she had very mild symptoms.   Current Level of Function   Current Community Support Family/friend caregiver   Patient role/employment history F. Retired   Living environment [place]   Fall Risk  "Screen   Fall screen completed by PT   Have you fallen 2 or more times in the past year? Yes   Have you fallen and had an injury in the past year? Yes  (knees )   Timed Up and Go score (seconds) 11.4 sec   Is patient a fall risk? No   Abuse Screen (yes response referral indicated)   Feels Unsafe at Home or Work/School no   Feels Threatened by Someone no   Does Anyone Try to Keep You From Having Contact with Others or Doing Things Outside Your Home? no   Physical Signs of Abuse Present no   Pelvic Floor Dysfunction Questions   Regular exercise No   Fluid intake-glasses/day (one glass/cup = 8oz Water = 48-64oz/day, Milk = 16oz/day   Caffeinated beverages-glasses/day Coffee = 2-4c/day   Alcoholic beverages - glasses/day Occasional   Recent diet change? No   How long can you delay the need to urinate?  Immediate   How many times do you wake to urinate at night?   2-4   How often do you urinate during the day?   Every 2 hours   Can you stop the flow of urine when on the toilet?  No   Is the volume of urine passed usually  Medium   Do you have the sensation that you need to go to the toilet?  Yes   Do you empty your bladder frequently, before you experience the urge to pass urine?  No   Do you have \"triggers\" that make you feel you can't wait to go to the toilet?  Yes   Number of bladder infections last year?  0   Frequency of bowel movements:  Daily   Consistency of stool?  Soft formed   Do you ignore the urge to defecate?  No   Women's Health Questions   Number of pregnancies  5   Number of vaginal deliveries  4   Number of  section deliveries  0   Weight of largest baby  8lbs 8oz   Number of episiotomies  4   Pelvic Floor Dysfunction Objective Findings   Type of Storage Problem frequency;urgency;stress incontinence;urge incontinence;nocturia;nocturial enuresis   Protection needed Pad;Number of pads per day   Power (MMT at [facility]) PFM manual assessment not performed d/t time constraints of session. "   Medications Meds for urge incontinence   Pad Pantiliner   Number of pads 1   Meds for urge incontinence Oxybutinin   Planned Therapy Interventions   Planned Therapy Interventions joint mobilization;manual therapy;motor coordination training;neuromuscular re-education;strengthening;stretching   Planned Modality Interventions   Planned Modality Interventions Biofeedback;Electrical stimulation   Clinical Impression   Criteria for Skilled Therapeutic Interventions Met yes, treatment indicated   PT Diagnosis Impaired function of the Pelvic Floor Muscle   Influenced by the following impairments weakness (per subjective report of symptoms), poor knowledge of functional use of PFM   Functional limitations due to impairments frequency, urgency, mixed incontinence   Clinical Presentation Unstable/Unpredictable   Clinical Presentation Rationale longstanding nature of symptoms with recent worsening, upredictable leaking/urgency pattern, multiple comorbidities   Clinical Decision Making (Complexity) Moderate complexity   Therapy Frequency 1 time/week   Predicted Duration of Therapy Intervention (days/wks) 8 weeks, weaning to every other week for up to 6 sessions.   Risk & Benefits of therapy have been explained Yes   Patient, Family & other staff in agreement with plan of care Yes   Clinical Impression Comments Pt presents with 2+ h/o urinary urgency and leaking.  Has been on Oxybutinin for multiple years and feels the side effects and cost are becoming unmanageable. Pt could benefit from skilled PT to improve PFM function and control to meet set goals.    Education Assessment   Preferred Learning Style Listening;Reading;Demonstration;Pictures/video   Barriers to Learning No barriers   Ortho Goal 1   Goal Identifier STG   Goal Description 1) Pt will improve PFM control to report ability to make it to bathroom at home with 50% of her urges, in 4 weeks.    Target Date 07/10/19   Ortho Goal 2   Goal Identifier STG   Goal  Description 2)Pt will improve her PFM control to report 50% of coughing and sneezing without UI, in 4 weeks.    Target Date 07/10/19   Ortho Goal 3   Goal Identifier LTG   Goal Description 3)Pt will report void times of every 3 hours without Urge UI, in 8 weeks.    Target Date 08/07/19   Ortho Goal 4   Goal Identifier LTG   Goal Description 4)Pt will report 5/7 days dry, in 8 weeks.   Target Date 08/07/19   Ortho Goal 5   Goal Identifier LTG   Goal Description 5) Pt will be indep in HEP to prevent return of symptoms in 8 weeks.    Target Date 08/07/19   Total Evaluation Time   PT Eval, Moderate Complexity Minutes (34646) 35   Therapy Certification   Certification date from 06/12/19   Certification date to 08/07/19   Medical Diagnosis Overactive bladder   Thank you for the referral of this patient.  Jessica Hammonds, PT, MA  #[id number]

## 2019-06-12 NOTE — PROGRESS NOTES
[facility]          OUTPATIENT PHYSICAL THERAPY ORTHOPEDIC EVALUATION  PLAN OF TREATMENT FOR OUTPATIENT REHABILITATION  (COMPLETE FOR INITIAL CLAIMS ONLY)  Patient's Last Name, First Name, M.I.  YOB: 1950  Audelia Aceves    Provider s Name:  [facility]   Medical Record No.  [id number]   Start of Care Date:  06/12/19   Onset Date:  05/27/19   Type:     _X__PT   ___OT   ___SLP Medical Diagnosis:  Overactive bladder     PT Diagnosis:  Impaired function of the Pelvic Floor Muscle   Visits from SOC:  1      _________________________________________________________________________________  Plan of Treatment/Functional Goals:  joint mobilization, manual therapy, motor coordination training, neuromuscular re-education, strengthening, stretching     Biofeedback, Electrical stimulation     Goals  Goal Identifier: STG  Goal Description: 1) Pt will improve PFM control to report ability to make it to bathroom at home with 50% of her urges, in 4 weeks.   Target Date: 07/10/19    Goal Identifier: STG  Goal Description: 2)Pt will improve her PFM control to report 50% of coughing and sneezing without UI, in 4 weeks.   Target Date: 07/10/19    Goal Identifier: LTG  Goal Description: 3)Pt will report void times of every 3 hours without Urge UI, in 8 weeks.   Target Date: 08/07/19    Goal Identifier: LTG  Goal Description: 4)Pt will report 5/7 days dry, in 8 weeks.  Target Date: 08/07/19    Goal Identifier: LTG  Goal Description: 5) Pt will be indep in HEP to prevent return of symptoms in 8 weeks.   Target Date: 08/07/19                Therapy Frequency:  1 time/week  Predicted Duration of Therapy Intervention:  8 weeks, weaning to every other week for up to 6 sessions.    Jessica Hammonds, PT                 I CERTIFY THE NEED FOR THESE SERVICES FURNISHED UNDER        THIS PLAN OF TREATMENT AND WHILE UNDER MY CARE     (Physician co-signature of this document indicates  review and certification of the therapy plan).                       Certification Date From:  06/12/19   Certification Date To:  08/07/19    Referring Provider:  Anu Desai MD    Initial Assessment        See Epic Evaluation Start of Care Date: 06/12/19

## 2019-06-14 DIAGNOSIS — N32.81 OVERACTIVE BLADDER: ICD-10-CM

## 2019-06-14 NOTE — TELEPHONE ENCOUNTER
"Requested Prescriptions   Pending Prescriptions Disp Refills     oxybutynin (DITROPAN) 5 MG tablet [Pharmacy Med Name: OXYBUTYNIN CHLORIDE 5MG TABS] 90 tablet 0     Sig: TAKE ONE TABLET BY MOUTH THREE TIMES A DAY -- NEEDS FASTING LAB AND APPOINTMENT FOR FURTHER REFILLS       Muscarinic Antagonists (Urinary Incontinence Agents) Passed - 6/14/2019  5:15 AM        Passed - Recent (12 mo) or future (30 days) visit within the authorizing provider's specialty     Patient had office visit in the last 12 months or has a visit in the next 30 days with authorizing provider or within the authorizing provider's specialty.  See \"Patient Info\" tab in inbasket, or \"Choose Columns\" in Meds & Orders section of the refill encounter.              Passed - Medication is Oxybutynin and patient is 5 years of age or older        Passed - Patient does not have a diagnosis of glaucoma on the problem list     If glaucoma diagnosis is new, refer refill to physician.          Passed - Medication is active on med list        Passed - Patient is 18 years of age or older      OXYBUTYNIN CHLORIDE ER PO  Last Written Prescription Date:  06/06/2019  Last Fill Quantity: ?,  # refills: ?   Last office visit: 5/20/2019 with prescribing provider:  SANNA Desai   Future Office Visit:   Next 5 appointments (look out 90 days)    Jun 21, 2019  9:00 AM CDT  SHORT with Anu Desai MD  [facility] ([facility]) [address] [place] [id number]  [phone]         Abril Orozco RT (R) (M)      "

## 2019-06-14 NOTE — TELEPHONE ENCOUNTER
Routing refill request to provider for review/approval because:  Medication is reported/historical    Svitlana SWANSON RN

## 2019-06-17 RX ORDER — OXYBUTYNIN CHLORIDE 5 MG/1
TABLET ORAL
Qty: 90 TABLET | Refills: 0 | OUTPATIENT
Start: 2019-06-17

## 2019-06-17 RX ORDER — TROSPIUM CHLORIDE 20 MG/1
20 TABLET, FILM COATED ORAL
Qty: 60 TABLET | Refills: 1 | Status: SHIPPED | OUTPATIENT
Start: 2019-06-17 | End: 2019-08-31

## 2019-06-17 RX ORDER — MIRABEGRON 25 MG/1
25 TABLET, FILM COATED, EXTENDED RELEASE ORAL DAILY
Qty: 30 TABLET | Refills: 1 | Status: SHIPPED | OUTPATIENT
Start: 2019-06-17 | End: 2019-06-17

## 2019-06-21 ENCOUNTER — OFFICE VISIT (OUTPATIENT)
Dept: FAMILY MEDICINE | Facility: CLINIC | Age: 69
End: 2019-06-21
Payer: COMMERCIAL

## 2019-06-21 VITALS
OXYGEN SATURATION: 97 % | WEIGHT: 252 LBS | SYSTOLIC BLOOD PRESSURE: 138 MMHG | HEART RATE: 74 BPM | RESPIRATION RATE: 20 BRPM | DIASTOLIC BLOOD PRESSURE: 84 MMHG | BODY MASS INDEX: 40.67 KG/M2 | TEMPERATURE: 98 F

## 2019-06-21 DIAGNOSIS — Z23 NEED FOR VACCINATION: ICD-10-CM

## 2019-06-21 DIAGNOSIS — E78.5 HYPERLIPIDEMIA LDL GOAL <130: ICD-10-CM

## 2019-06-21 DIAGNOSIS — I49.9 IRREGULAR HEART BEAT: ICD-10-CM

## 2019-06-21 DIAGNOSIS — I77.810 ASCENDING AORTA DILATATION (H): ICD-10-CM

## 2019-06-21 DIAGNOSIS — I10 HYPERTENSION GOAL BP (BLOOD PRESSURE) < 140/90: Primary | ICD-10-CM

## 2019-06-21 DIAGNOSIS — E03.9 HYPOTHYROIDISM, UNSPECIFIED TYPE: ICD-10-CM

## 2019-06-21 DIAGNOSIS — K21.9 GASTROESOPHAGEAL REFLUX DISEASE WITHOUT ESOPHAGITIS: ICD-10-CM

## 2019-06-21 DIAGNOSIS — N32.81 OVERACTIVE BLADDER: ICD-10-CM

## 2019-06-21 PROCEDURE — 90732 PPSV23 VACC 2 YRS+ SUBQ/IM: CPT | Performed by: FAMILY MEDICINE

## 2019-06-21 PROCEDURE — 99215 OFFICE O/P EST HI 40 MIN: CPT | Mod: 25 | Performed by: FAMILY MEDICINE

## 2019-06-21 PROCEDURE — G0009 ADMIN PNEUMOCOCCAL VACCINE: HCPCS | Performed by: FAMILY MEDICINE

## 2019-06-21 NOTE — PATIENT INSTRUCTIONS
Let me know how the Santura is going for you    Mei fried  [phone]    Blood work in 2 weeks    See you in 6 months

## 2019-06-21 NOTE — PROGRESS NOTES
Subjective     Audelia Aceves is a 69 year old female who presents to clinic today for the following health issues:    HPI   Medication Followup of Bladder    Taking Medication as prescribed: NO-RX errors for cost       Overactive bladder-  mirabegron was too expensive, I ordered Santura, has not had a chance to pick that up yet.     Heart follow up Echocardiogram  Does get shortness of breath, usually going up and down stairs. Does get chest pain at times. Happens at rest.   Pain is sharp.   Had a Lexicon stress test 3 years ago at first light that was normal.   Symptoms have not changed since then.   EKG showed multiple PVCs    Echocardiogram showed:     Left ventricular systolic function is normal.  The aortic valve is not well visualized.  Mild valvular aortic stenosis.  Aortic valve not well seen, cannot tell if bicuspid or tricuspid with  calcification. YANNICK would better evaluate the morphology but the valve is only  mildly stenotic and not clinicially significant. Repeat echo in 2 years  suggested. Consider CT of ascending aorta to better determine aorta size  Ascending aorta not well seen may be up to 43mm    hypertension   On Amlodipine, losartan  BP Readings from Last 6 Encounters:   06/21/19 138/84   06/11/19 (!) 155/94   05/20/19 118/88   04/23/18 153/83   04/16/18 (!) 132/94   03/22/18 144/84      Hyperlipidemia-on lovastatin  Recent Labs   Lab Test 05/15/19  0819 02/23/18  0749  08/26/15  0924 08/21/14  0830   CHOL 164 168   < > 183 172   HDL 60 53   < > 63 57   LDL 87 92   < > 99 97   TRIG 84 113   < > 105 92   CHOLHDLRATIO  --   --   --  2.9 3.0    < > = values in this interval not displayed.      gastroesophageal reflux disease-is taking the omeprazole every other day and so far that is going well    Hypothyroidism-on levothyroxine   TSH   Date Value Ref Range Status   05/15/2019 3.48 0.40 - 4.00 mU/L Final      BP Readings from Last 3 Encounters:   06/21/19 138/84   06/11/19 (!) 155/94    05/20/19 118/88    Wt Readings from Last 3 Encounters:   06/21/19 114.3 kg (252 lb)   06/11/19 117 kg (258 lb)   05/20/19 117 kg (258 lb)            Reviewed and updated as needed this visit by Provider         Review of Systems   ROS: 5 point ROS negative except as noted above in HPI, including Gen., Resp., CV, GI &  system review.       Objective    /84 (BP Location: Right arm)   Pulse 74   Temp 98  F (36.7  C) (Tympanic)   Resp 20   Wt 114.3 kg (252 lb)   SpO2 97%   BMI 40.67 kg/m    Body mass index is 40.67 kg/m .  Physical Exam   General: appears well, no distress  Neck: supple, no adenopathy  Heart: regular rate and rhythm, normal S1S2, 2/6 systolic ejection murmur   Lungs: clear to ascultation       ASSESSMENT:  1. Hypertension goal BP (blood pressure) < 140/90    2. Overactive bladder    3. Ascending aorta dilatation (H)    4. Irregular heart beat    5. Hypothyroidism, unspecified type    6. Hyperlipidemia LDL goal <130    7. Need for vaccination    8. Gastroesophageal reflux disease without esophagitis        PLAN:  Orders Placed This Encounter     Pneumococcal vaccine 23 valent PPSV23  (Pneumovax) [14965]     ADMIN MEDICARE: Pneumococcal Vaccine ()     TSH     CARDIOLOGY EVAL ADULT REFERRAL     40 min spent with patient, greater than 50% in counseling and coordination of care and discussion of multiple issues, including results of her echocardiogram.   With all that she has going on, would recommend seeing cardiology to sort it all out.     Patient Instructions   Let me know how the Santura is going for you    Zio patch  [phone]    Blood work in 2 weeks    See you in 6 months    Anu More MD

## 2019-06-21 NOTE — NURSING NOTE
"Chief Complaint   Patient presents with     Bladder Problems     recheck meds - has not started new med - it just came in Tuesday     Heart Problem     go over tests       Initial /84 (BP Location: Right arm)   Pulse 74   Temp 98  F (36.7  C) (Tympanic)   Resp 20   Wt 114.3 kg (252 lb)   SpO2 97%   BMI 40.67 kg/m   Estimated body mass index is 40.67 kg/m  as calculated from the following:    Height as of 6/11/19: 1.676 m (5' 6\").    Weight as of this encounter: 114.3 kg (252 lb).    Patient presents to the clinic using No DME    Health Maintenance that is potentially due pending provider review:  NONE    n/a    Is there anyone who you would like to be able to receive your results? No  If yes have patient fill out LEANA    "

## 2019-06-23 PROBLEM — Z23 NEED FOR VACCINATION: Status: ACTIVE | Noted: 2019-06-23

## 2019-06-23 PROBLEM — I77.810 ASCENDING AORTA DILATATION (H): Status: ACTIVE | Noted: 2019-06-23

## 2019-06-23 PROBLEM — K21.9 GASTROESOPHAGEAL REFLUX DISEASE WITHOUT ESOPHAGITIS: Status: ACTIVE | Noted: 2019-06-23

## 2019-07-10 ENCOUNTER — HOSPITAL ENCOUNTER (OUTPATIENT)
Dept: PHYSICAL THERAPY | Facility: CLINIC | Age: 69
Setting detail: THERAPIES SERIES
End: 2019-07-10
Attending: FAMILY MEDICINE
Payer: MEDICARE

## 2019-07-10 ENCOUNTER — HOSPITAL ENCOUNTER (OUTPATIENT)
Dept: CARDIOLOGY | Facility: CLINIC | Age: 69
Discharge: HOME | End: 2019-07-10
Attending: FAMILY MEDICINE | Admitting: FAMILY MEDICINE
Payer: MEDICARE

## 2019-07-10 DIAGNOSIS — I49.9 IRREGULAR HEART BEAT: ICD-10-CM

## 2019-07-10 DIAGNOSIS — R73.09 ELEVATED GLUCOSE: ICD-10-CM

## 2019-07-10 DIAGNOSIS — E03.9 HYPOTHYROIDISM, UNSPECIFIED TYPE: ICD-10-CM

## 2019-07-10 LAB
HBA1C MFR BLD: 6.1 % (ref 0–5.6)
TSH SERPL DL<=0.005 MIU/L-ACNC: 0.47 MU/L (ref 0.4–4)

## 2019-07-10 PROCEDURE — 90911 ZZHC PT BIOFEEDBACK (PFM): CPT | Mod: GP | Performed by: PHYSICAL THERAPIST

## 2019-07-10 PROCEDURE — 97110 THERAPEUTIC EXERCISES: CPT | Mod: GP | Performed by: PHYSICAL THERAPIST

## 2019-07-10 PROCEDURE — 0298T ZIO PATCH HOLTER ADULT PEDIATRIC GREATER THAN 48 HRS: CPT | Performed by: INTERNAL MEDICINE

## 2019-07-10 PROCEDURE — 84443 ASSAY THYROID STIM HORMONE: CPT | Performed by: FAMILY MEDICINE

## 2019-07-10 PROCEDURE — 83036 HEMOGLOBIN GLYCOSYLATED A1C: CPT | Performed by: FAMILY MEDICINE

## 2019-07-10 PROCEDURE — 36415 COLL VENOUS BLD VENIPUNCTURE: CPT | Performed by: FAMILY MEDICINE

## 2019-07-10 PROCEDURE — 97535 SELF CARE MNGMENT TRAINING: CPT | Mod: GP,59 | Performed by: PHYSICAL THERAPIST

## 2019-07-10 PROCEDURE — 0296T ZIO PATCH HOLTER ADULT PEDIATRIC GREATER THAN 48 HRS: CPT

## 2019-08-01 ENCOUNTER — OFFICE VISIT (OUTPATIENT)
Dept: CARDIOLOGY | Facility: CLINIC | Age: 69
End: 2019-08-01
Attending: FAMILY MEDICINE
Payer: COMMERCIAL

## 2019-08-01 VITALS
WEIGHT: 254 LBS | OXYGEN SATURATION: 98 % | DIASTOLIC BLOOD PRESSURE: 101 MMHG | HEART RATE: 87 BPM | BODY MASS INDEX: 41 KG/M2 | SYSTOLIC BLOOD PRESSURE: 161 MMHG

## 2019-08-01 DIAGNOSIS — R06.02 SOB (SHORTNESS OF BREATH): ICD-10-CM

## 2019-08-01 DIAGNOSIS — R00.2 PALPITATIONS: ICD-10-CM

## 2019-08-01 DIAGNOSIS — R07.9 CHEST PAIN, UNSPECIFIED TYPE: Primary | ICD-10-CM

## 2019-08-01 PROCEDURE — 99205 OFFICE O/P NEW HI 60 MIN: CPT | Performed by: INTERNAL MEDICINE

## 2019-08-01 RX ORDER — CARVEDILOL 6.25 MG/1
6.25 TABLET ORAL 2 TIMES DAILY WITH MEALS
Qty: 60 TABLET | Refills: 3 | Status: SHIPPED | OUTPATIENT
Start: 2019-08-01 | End: 2019-10-10

## 2019-08-01 NOTE — LETTER
8/1/2019    Anu Desai MD  [address] [place] [id number]    RE: Audelia Aceves       Dear Colleague,    I had the pleasure of seeing Audelia Aceves in the [facility].    HPI and Plan:   See dictation [id number]    Orders Placed This Encounter   Procedures     CTA Angiogram coronary artery     CT Chest Angio w/o & w Contrast     N terminal pro BNP outpatient     Follow-Up with Cardiologist     Mei Méndez Holter Adult Pediatric Greater than 48 hrs     Orders Placed This Encounter   Medications     carvedilol (COREG) 6.25 MG tablet     Sig: Take 1 tablet (6.25 mg) by mouth 2 times daily (with meals)     Dispense:  60 tablet     Refill:  3     There are no discontinued medications.      Encounter Diagnoses   Name Primary?     Chest pain, unspecified type Yes     SOB (shortness of breath)      Palpitations        CURRENT MEDICATIONS:  Current Outpatient Medications   Medication Sig Dispense Refill     amLODIPine (NORVASC) 10 MG tablet Take 1 tablet (10 mg) by mouth daily 90 tablet 3     aspirin 81 MG tablet Take by mouth daily       calcium 600-200 MG-UNIT TABS Take 1 tablet by mouth daily 90 tablet 1     carvedilol (COREG) 6.25 MG tablet Take 1 tablet (6.25 mg) by mouth 2 times daily (with meals) 60 tablet 3     Cholecalciferol (VITAMIN D3) 2000 UNITS CAPS Take 1 capsule by mouth       Glucosamine HCl 1000 MG TABS Take 1 tablet by mouth 2 times daily.       levothyroxine (SYNTHROID/LEVOTHROID) 175 MCG tablet Take 1 tablet (175 mcg) by mouth daily 30 tablet 2     losartan (COZAAR) 100 MG tablet Take 1 tablet (100 mg) by mouth daily 90 tablet 3     lovastatin (MEVACOR) 40 MG tablet TAKE ONE TABLET BY MOUTH AT BEDTIME 90 tablet 3     meloxicam (MOBIC) 15 MG tablet Take 0.5 tablets (7.5 mg) by mouth 2 times daily as needed for moderate pain 90 tablet 3     omeprazole (PRILOSEC) 20 MG DR capsule TAKE ONE CAPSULE BY MOUTH ONCE DAILY 20 - 60 MINUTES BEFORE A MEAL 90 capsule  3     trospium (SANCTURA) 20 MG tablet Take 1 tablet (20 mg) by mouth 2 times daily (before meals) 60 tablet 1       ALLERGIES     Allergies   Allergen Reactions     Lisinopril Cough     Sulfa Drugs Rash       PAST MEDICAL HISTORY:  Past Medical History:   Diagnosis Date     Abnormal Papanicolaou smear of cervix and cervical HPV      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     Endometrial polyp 7/31/2009     Major depression in complete remission (H) 3/8/2011     Sebaceous cyst 3/26/2010     Thyrotoxicosis without mention of goiter or other cause, without mention of thyrotoxic crisis or storm        PAST SURGICAL HISTORY:  Past Surgical History:   Procedure Laterality Date     ARTHROPLASTY TOE(S)  5/27/2011    Procedure:ARTHROPLASTY TOE(S); Subtalar implant-Kidner Procedure; Surgeon:LUIS DANIEL GRIDER; Location:[facility] OR     ARTHROSCOPY KNEE RT/LT  6-    left knee meniscal tear     COLONOSCOPY  10/13/06    1 [id number]-pusdse in 10 yrs     COLONOSCOPY N/A 6/11/2019    Procedure: COLONOSCOPY;  Surgeon: Rylan Montano MD;  Location: [facility]     GYN SURGERY       LENGTHEN TENDON ACHILLES  5/27/2011    Procedure:LENGTHEN TENDON ACHILLES; Tendon transfer-Anes. consult-block       SURGICAL HISTORY OF -   1982    Tubal ligation     SURGICAL HISTORY OF -       uterine polyp removal     SURGICAL HISTORY OF -       left foot surgery for bunionette       FAMILY HISTORY:  Family History   Problem Relation Age of Onset     Hypertension Mother      Lipids Mother      Diabetes Mother      Myocardial Infarction Mother      Hypertension Brother      Blood Disease Daughter         neutropenia     Thyroid Disease Sister      Musculoskeletal Disorder Sister         fibromyalia     Gastrointestinal Disease Son         ibs     Depression Daughter      Breast Cancer No family hx of      Colon Cancer No family hx of        SOCIAL HISTORY:  Social History     Socioeconomic History     Marital status:      Spouse name:  Jacob     Number of children: 4     Years of education: 15     Highest education level: None   Occupational History     Occupation:      Employer: [employer]   Social Needs     Financial resource strain: None     Food insecurity:     Worry: None     Inability: None     Transportation needs:     Medical: None     Non-medical: None   Tobacco Use     Smoking status: Never Smoker     Smokeless tobacco: Never Used   Substance and Sexual Activity     Alcohol use: Yes     Comment: occ     Drug use: No     Sexual activity: Yes     Partners: Male   Lifestyle     Physical activity:     Days per week: None     Minutes per session: None     Stress: None   Relationships     Social connections:     Talks on phone: None     Gets together: None     Attends [employer] service: None     Active member of club or organization: None     Attends meetings of clubs or organizations: None     Relationship status: None     Intimate partner violence:     Fear of current or ex partner: None     Emotionally abused: None     Physically abused: None     Forced sexual activity: None   Other Topics Concern      Service No     Blood Transfusions No     Caffeine Concern No     Comment: 2-4 half decaf     Occupational Exposure No     Hobby Hazards No     Sleep Concern Yes     Comment: frequent waking 2-3 times a night, trouble going back to sleep     Stress Concern Yes     Weight Concern Yes     Special Diet No     Back Care No     Exercise No     Comment: not often     Bike Helmet No     Seat Belt Yes     Self-Exams No     Comment: recommendation     Parent/sibling w/ CABG, MI or angioplasty before 65F 55M? No   Social History Narrative     None       Review of Systems:  Skin:  Negative     Eyes:  Positive for glasses  ENT:  Negative    Respiratory:  Negative    Cardiovascular:    edema;Positive for;lightheadedness;dizziness  Gastroenterology: Positive for heartburn  Genitourinary:  Negative    Musculoskeletal:   Negative    Neurologic:  Positive for numbness or tingling of hands;numbness or tingling of feet;headaches  Psychiatric:  Positive for depression  Heme/Lymph/Imm:  Negative    Endocrine:  Positive for thyroid disorder    Physical Exam:  Vitals: BP (!) 161/101 (BP Location: Right arm)   Pulse 87   Wt 115.2 kg (254 lb)   SpO2 98%   BMI 41.00 kg/m         Recent Lab Results:  LIPID RESULTS:  Lab Results   Component Value Date    CHOL 164 05/15/2019    HDL 60 05/15/2019    LDL 87 05/15/2019    TRIG 84 05/15/2019    CHOLHDLRATIO 2.9 08/26/2015       LIVER ENZYME RESULTS:  Lab Results   Component Value Date    AST 15 05/15/2019    ALT 27 05/15/2019       CBC RESULTS:  Lab Results   Component Value Date    WBC 7.4 05/15/2019    RBC 4.91 05/15/2019    HGB 14.5 05/15/2019    HCT 44.4 05/15/2019    MCV 90 05/15/2019    MCH 29.5 05/15/2019    MCHC 32.7 05/15/2019    RDW 14.3 05/15/2019     05/15/2019       BMP RESULTS:  Lab Results   Component Value Date     05/15/2019    POTASSIUM 3.6 05/15/2019    CHLORIDE 107 05/15/2019    CO2 28 05/15/2019    ANIONGAP 3 05/15/2019     (H) 05/15/2019    BUN 14 05/15/2019    CR 0.58 05/15/2019    GFRESTIMATED >90 05/15/2019    GFRESTBLACK >90 05/15/2019    KERRY 9.0 05/15/2019        A1C RESULTS:  Lab Results   Component Value Date    A1C 6.1 (H) 07/10/2019       INR RESULTS:  No results found for: INR        CC  Anu Desai MD  [address] [place] [id number]                    Thank you for allowing me to participate in the care of your patient.      Sincerely,     Marlin Benjamin MD     [facility]    cc:   Anu Desai MD  [address] [place] [phone]

## 2019-08-01 NOTE — LETTER
8/1/2019      Anu Desai MD  [address] [place] [id number]      RE: Audelia Aceves       Dear Colleague,    I had the pleasure of seeing Audelia MCKENNA Ketan in the [facility].    Service Date: 08/01/2019      REASON FOR VISIT:  Chest discomfort, shortness of breath and irregular heartbeats.      HISTORY OF PRESENTING ILLNESS:  This is a delightful, 69-year-old female who is here with her significant other.  She says that her daughter is a nurse and could not make it to the appointment today.  The patient says that 3 years ago at an outside facility she had a stress test, which she says she was told was normal.  This was done for chest discomfort, which since then has gone away.  The patient has a history of hypertension, for which she takes losartan 100 mg daily and amlodipine 10 mg daily in addition to aspirin and statin.  She denies any prior cardiovascular history, such as MI, stents or strokes.  She says she has a family history of coronary artery disease in the 60s-70s.  She recently went to her PCP complaining of worsening shortness of breath and irregular heart sensations.  She had a Ziopatch monitor placed, which was done for a total of 7 days.  That monitor showed 2 runs of nonsustained ventricular tachycardia; the longest was up to 6 beats.  Eleven runs of SVT were noted, again the longest up to 9 beats.  The patient did not have any events during this episode.  She also had an echocardiogram done, which revealed normal left ventricular size and function and normal right ventricular size and function, but Dr. Nash who read it could not rule out the possibility of a bicuspid aortic valve.  In any case, there was mild aortic stenosis, and the image quality was not best, but the aorta was reported at 43 mm.  As such, today she comes in for consultation for these findings.      She tells me that she is not extremely active, but she does get short of  breath when she walks around and does a lot of stuff, especially climbing stairs.  She denies classic symptoms of angina, such as chest pain, arm pain or jaw pain, but sometimes gets some chest heaviness.  No syncope or presyncope.  Occasional dizziness that can be sometimes positional.    She says she does not check her blood pressure quite routinely, but when she does, she does have it in the 130s-150s range at home.  Today she has just taken her meds before walking to the clinic, and her blood pressure is elevated, she says.      PHYSICAL EXAMINATION:   VITAL SIGNS:  Blood pressure is 161/101 with a pulse of 87.   GENERAL:  Alert and oriented x3, in no acute distress.   LUNGS:  Clear.   CARDIAC:  Normal with an ejection systolic murmur peaking in early systole.   EXTREMITIES:  Warm with trace to minimal bilateral edema.   ABDOMEN:  Soft, nontender and nondistended.   NECK:  Supple.  JVP is hard to see.   HEENT:  No icterus or pallor.   PSYCHIATRIC:  Appropriate affect.   NEUROLOGIC:  Gross motor neuro exam is nonfocal.      PERTINENT DATA:  LDL is 87 with an HDL of 60.  She is prediabetic with an A1c of 6.1.  Last EKG that she had showed normal sinus rhythm with PVCs.  Ziopatch monitor, however, did show a significant PVC burden at 7.2% and 2 very short runs of nonsustained VT.      ASSESSMENT AND PLAN:   1.  Shortness of breath.  This seems to be multifactorial in the setting of obesity, uncontrolled hypertension and potentially mild heart failure with preserved EF.  We will check an NT-proBNP level.  Clinically, she does not seem to be carrying a lot of water, and first we need to control her blood pressure.  We will start her on carvedilol 6.25 mg b.i.d. in addition to her losartan and amlodipine that she is taking.  I have told her to write down the blood pressure readings, and we will follow up on that in about a month.  Salt restriction is advised.   2.  In regard to her chest heaviness, I think she has  atypical symptoms concerning for angina.  She is prediabetic, her creatinine is normal, and her LDL is normal.  I discussed options for stress testing, which may be not very conclusive given her body habitus, and I recommended either doing a CT angio versus coronary angiography.  Given the risks of an invasive procedure, we have decided to proceed with a coronary CT angio at this time.   3.  Mild aortic stenosis.  Routine followup will be done in 1-2 years.   4.  Given her possibility of a bicuspid aortic valve, a CT scan will potentially evaluate and tell us if it is bicuspid or tricuspid.  If it is bicuspid aortic valve, then her family members need to be screened.   5.  Given the mild ascending aortic dilatation noted on echo, this might be off axis due to poor image quality.  We will also get a CT of the ascending aorta along with a CT angiogram of her coronaries.  All of this will be done at [facility].   6.  In regard to her frequent PVCs, beta-blocker initiation will be important.  We will repeat a monitor while she is on beta-blocker therapy.  I am hoping to see that her PVCs reduce in burden and her VT does not recur.  Of course, if there are any high-risk findings that come up on the Ziopatch monitor, then we will need to consider an invasive coronary angiogram, especially if the CTA is abnormal.         I will see her back in about a month with all of this testing.  If anything changes in the interim, please do not hesitate to contact me with any questions.  If her clinical symptoms get worse, she has been strongly advised to seek immediate medical attention by going to the ER.  She needs to restrict her calories and to take it easy until we have more diagnostic data on her angio and attempt to lose weight.            cc:   Anu Desai MD   [place] [id number] [place] [id number]         SIRENA DENT MD             D: 08/01/2019   T: 08/01/2019   MT: breanne      Name:      ORLIN GRANT   MRN:      [id number]        Account:      [id number]   :      1950           Service Date: 2019      Document: [id number]           Outpatient Encounter Medications as of 2019   Medication Sig Dispense Refill     amLODIPine (NORVASC) 10 MG tablet Take 1 tablet (10 mg) by mouth daily 90 tablet 3     aspirin 81 MG tablet Take by mouth daily       calcium 600-200 MG-UNIT TABS Take 1 tablet by mouth daily 90 tablet 1     carvedilol (COREG) 6.25 MG tablet Take 1 tablet (6.25 mg) by mouth 2 times daily (with meals) 60 tablet 3     Cholecalciferol (VITAMIN D3) 2000 UNITS CAPS Take 1 capsule by mouth       Glucosamine HCl 1000 MG TABS Take 1 tablet by mouth 2 times daily.       levothyroxine (SYNTHROID/LEVOTHROID) 175 MCG tablet Take 1 tablet (175 mcg) by mouth daily 30 tablet 2     losartan (COZAAR) 100 MG tablet Take 1 tablet (100 mg) by mouth daily 90 tablet 3     lovastatin (MEVACOR) 40 MG tablet TAKE ONE TABLET BY MOUTH AT BEDTIME 90 tablet 3     meloxicam (MOBIC) 15 MG tablet Take 0.5 tablets (7.5 mg) by mouth 2 times daily as needed for moderate pain 90 tablet 3     omeprazole (PRILOSEC) 20 MG DR capsule TAKE ONE CAPSULE BY MOUTH ONCE DAILY 20 - 60 MINUTES BEFORE A MEAL 90 capsule 3     trospium (SANCTURA) 20 MG tablet Take 1 tablet (20 mg) by mouth 2 times daily (before meals) 60 tablet 1     No facility-administered encounter medications on file as of 2019.        Again, thank you for allowing me to participate in the care of your patient.      Sincerely,    Marlin Benjamin MD     [facility]

## 2019-08-01 NOTE — PROGRESS NOTES
Service Date: 08/01/2019      REASON FOR VISIT:  Chest discomfort, shortness of breath and irregular heartbeats.      HISTORY OF PRESENTING ILLNESS:  This is a delightful, 69-year-old female who is here with her significant other.  She says that her daughter is a nurse and could not make it to the appointment today.  The patient says that 3 years ago at an outside facility she had a stress test, which she says she was told was normal.  This was done for chest discomfort, which since then has gone away.  The patient has a history of hypertension, for which she takes losartan 100 mg daily and amlodipine 10 mg daily in addition to aspirin and statin.  She denies any prior cardiovascular history, such as MI, stents or strokes.  She says she has a family history of coronary artery disease in the 60s-70s.  She recently went to her PCP complaining of worsening shortness of breath and irregular heart sensations.  She had a Ziopatch monitor placed, which was done for a total of 7 days.  That monitor showed 2 runs of nonsustained ventricular tachycardia; the longest was up to 6 beats.  Eleven runs of SVT were noted, again the longest up to 9 beats.  The patient did not have any events during this episode.  She also had an echocardiogram done, which revealed normal left ventricular size and function and normal right ventricular size and function, but Dr. Nash who read it could not rule out the possibility of a bicuspid aortic valve.  In any case, there was mild aortic stenosis, and the image quality was not best, but the aorta was reported at 43 mm.  As such, today she comes in for consultation for these findings.      She tells me that she is not extremely active, but she does get short of breath when she walks around and does a lot of stuff, especially climbing stairs.  She denies classic symptoms of angina, such as chest pain, arm pain or jaw pain, but sometimes gets some chest heaviness.  No syncope or presyncope.   Occasional dizziness that can be sometimes positional.  She says she does not check her blood pressure quite routinely, but when she does, she does have it in the 130s-150s range at home.  Today she has just taken her meds before walking to the clinic, and her blood pressure is elevated, she says.      PHYSICAL EXAMINATION:   VITAL SIGNS:  Blood pressure is 161/101 with a pulse of 87.   GENERAL:  Alert and oriented x3, in no acute distress.   LUNGS:  Clear.   CARDIAC:  Normal with an ejection systolic murmur peaking in early systole.   EXTREMITIES:  Warm with trace to minimal bilateral edema.   ABDOMEN:  Soft, nontender and nondistended.   NECK:  Supple.  JVP is hard to see.   HEENT:  No icterus or pallor.   PSYCHIATRIC:  Appropriate affect.   NEUROLOGIC:  Gross motor neuro exam is nonfocal.      PERTINENT DATA:  LDL is 87 with an HDL of 60.  She is prediabetic with an A1c of 6.1.  Last EKG that she had showed normal sinus rhythm with PVCs.  Ziopatch monitor, however, did show a significant PVC burden at 7.2% and 2 very short runs of nonsustained VT.      ASSESSMENT AND PLAN:   1.  Shortness of breath.  This seems to be multifactorial in the setting of obesity, uncontrolled hypertension and potentially mild heart failure with preserved EF.  We will check an NT-proBNP level.  Clinically, she does not seem to be carrying a lot of water, and first we need to control her blood pressure.  We will start her on carvedilol 6.25 mg b.i.d. in addition to her losartan and amlodipine that she is taking.  I have told her to write down the blood pressure readings, and we will follow up on that in about a month.  Salt restriction is advised.   2.  In regard to her chest heaviness, I think she has atypical symptoms concerning for angina.  She is prediabetic, her creatinine is normal, and her LDL is normal.  I discussed options for stress testing, which may be not very conclusive given her body habitus, and I recommended either  doing a CT angio versus coronary angiography.  Given the risks of an invasive procedure, we have decided to proceed with a coronary CT angio at this time.   3.  Mild aortic stenosis.  Routine followup will be done in 1-2 years.   4.  Given her possibility of a bicuspid aortic valve, a CT scan will potentially evaluate and tell us if it is bicuspid or tricuspid.  If it is bicuspid aortic valve, then her family members need to be screened.   5.  Given the mild ascending aortic dilatation noted on echo, this might be off axis due to poor image quality.  We will also get a CT of the ascending aorta along with a CT angiogram of her coronaries.  All of this will be done at [facility].   6.  In regard to her frequent PVCs, beta-blocker initiation will be important.  We will repeat a monitor while she is on beta-blocker therapy.  I am hoping to see that her PVCs reduce in burden and her VT does not recur.  Of course, if there are any high-risk findings that come up on the Ziopatch monitor, then we will need to consider an invasive coronary angiogram, especially if the CTA is abnormal.         I will see her back in about a month with all of this testing.  If anything changes in the interim, please do not hesitate to contact me with any questions.  If her clinical symptoms get worse, she has been strongly advised to seek immediate medical attention by going to the ER.  She needs to restrict her calories and to take it easy until we have more diagnostic data on her angio and attempt to lose weight.      cc:   [place] [id number]         SIRENA DENT MD             D: 2019   T: 2019   MT: breanne      Name:     ORLIN GRANT   MRN:      [id number]        Account:      [id number]   :      1950           Service Date: 2019      Document: [id number]

## 2019-08-01 NOTE — PROGRESS NOTES
HPI and Plan:   See dictation [id number]    Orders Placed This Encounter   Procedures     CTA Angiogram coronary artery     CT Chest Angio w/o & w Contrast     N terminal pro BNP outpatient     Follow-Up with Cardiologist     [facility] Greater than 48 hrs     Orders Placed This Encounter   Medications     carvedilol (COREG) 6.25 MG tablet     Sig: Take 1 tablet (6.25 mg) by mouth 2 times daily (with meals)     Dispense:  60 tablet     Refill:  3     There are no discontinued medications.      Encounter Diagnoses   Name Primary?     Chest pain, unspecified type Yes     SOB (shortness of breath)      Palpitations        CURRENT MEDICATIONS:  Current Outpatient Medications   Medication Sig Dispense Refill     amLODIPine (NORVASC) 10 MG tablet Take 1 tablet (10 mg) by mouth daily 90 tablet 3     aspirin 81 MG tablet Take by mouth daily       calcium 600-200 MG-UNIT TABS Take 1 tablet by mouth daily 90 tablet 1     carvedilol (COREG) 6.25 MG tablet Take 1 tablet (6.25 mg) by mouth 2 times daily (with meals) 60 tablet 3     Cholecalciferol (VITAMIN D3) 2000 UNITS CAPS Take 1 capsule by mouth       Glucosamine HCl 1000 MG TABS Take 1 tablet by mouth 2 times daily.       levothyroxine (SYNTHROID/LEVOTHROID) 175 MCG tablet Take 1 tablet (175 mcg) by mouth daily 30 tablet 2     losartan (COZAAR) 100 MG tablet Take 1 tablet (100 mg) by mouth daily 90 tablet 3     lovastatin (MEVACOR) 40 MG tablet TAKE ONE TABLET BY MOUTH AT BEDTIME 90 tablet 3     meloxicam (MOBIC) 15 MG tablet Take 0.5 tablets (7.5 mg) by mouth 2 times daily as needed for moderate pain 90 tablet 3     omeprazole (PRILOSEC) 20 MG DR capsule TAKE ONE CAPSULE BY MOUTH ONCE DAILY 20 - 60 MINUTES BEFORE A MEAL 90 capsule 3     trospium (SANCTURA) 20 MG tablet Take 1 tablet (20 mg) by mouth 2 times daily (before meals) 60 tablet 1       ALLERGIES     Allergies   Allergen Reactions     Lisinopril Cough     Sulfa Drugs Rash       PAST MEDICAL  HISTORY:  Past Medical History:   Diagnosis Date     Abnormal Papanicolaou smear of cervix and cervical HPV      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     Endometrial polyp 7/31/2009     Major depression in complete remission (H) 3/8/2011     Sebaceous cyst 3/26/2010     Thyrotoxicosis without mention of goiter or other cause, without mention of thyrotoxic crisis or storm        PAST SURGICAL HISTORY:  Past Surgical History:   Procedure Laterality Date     ARTHROPLASTY TOE(S)  5/27/2011    Procedure:ARTHROPLASTY TOE(S); Subtalar implant-K[facility]ner Procedure; Surgeon:LUIS DANIEL GRIDER; Location:[facility]     ARTHROSCOPY KNEE RT/LT  6-    left knee meniscal tear     COLONOSCOPY  10/13/06    1 [id number]-pemevf-gvnmmt in 10 yrs     COLONOSCOPY N/A 6/11/2019    Procedure: COLONOSCOPY;  Surgeon: Rylan Montano MD;  Location: [facility]     GYN SURGERY       LENGTHEN TENDON ACHILLES  5/27/2011    Procedure:LENGTHEN TENDON ACHILLES; Tendon transfer-Anes. consult-block       SURGICAL HISTORY OF -   1982    Tubal ligation     SURGICAL HISTORY OF -       uterine polyp removal     SURGICAL HISTORY OF -       left foot surgery for bunionette       FAMILY HISTORY:  Family History   Problem Relation Age of Onset     Hypertension Mother      Lipids Mother      Diabetes Mother      Myocardial Infarction Mother      Hypertension Brother      Blood Disease Daughter         neutropenia     Thyroid Disease Sister      Musculoskeletal Disorder Sister         fibromyalia     Gastrointestinal Disease Son         ibs     Depression Daughter      Breast Cancer No family hx of      Colon Cancer No family hx of        SOCIAL HISTORY:  Social History     Socioeconomic History     Marital status:      Spouse name: Jacob     Number of children: 4     Years of education: 15     Highest education level: None   Occupational History     Occupation:      Employer: [employer]   Social Needs     Financial resource  strain: None     Food insecurity:     Worry: None     Inability: None     Transportation needs:     Medical: None     Non-medical: None   Tobacco Use     Smoking status: Never Smoker     Smokeless tobacco: Never Used   Substance and Sexual Activity     Alcohol use: Yes     Comment: occ     Drug use: No     Sexual activity: Yes     Partners: Male   Lifestyle     Physical activity:     Days per week: None     Minutes per session: None     Stress: None   Relationships     Social connections:     Talks on phone: None     Gets together: None     Attends Baptism service: None     Active member of club or organization: None     Attends meetings of clubs or organizations: None     Relationship status: None     Intimate partner violence:     Fear of current or ex partner: None     Emotionally abused: None     Physically abused: None     Forced sexual activity: None   Other Topics Concern      Service No     Blood Transfusions No     Caffeine Concern No     Comment: 2-4 half decaf     Occupational Exposure No     Hobby Hazards No     Sleep Concern Yes     Comment: frequent waking 2-3 times a night, trouble going back to sleep     Stress Concern Yes     Weight Concern Yes     Special Diet No     Back Care No     Exercise No     Comment: not often     Bike Helmet No     Seat Belt Yes     Self-Exams No     Comment: recommendation     Parent/sibling w/ CABG, MI or angioplasty before 65F 55M? No   Social History Narrative     None       Review of Systems:  Skin:  Negative     Eyes:  Positive for glasses  ENT:  Negative    Respiratory:  Negative    Cardiovascular:    edema;Positive for;lightheadedness;dizziness  Gastroenterology: Positive for heartburn  Genitourinary:  Negative    Musculoskeletal:  Negative    Neurologic:  Positive for numbness or tingling of hands;numbness or tingling of feet;headaches  Psychiatric:  Positive for depression  Heme/Lymph/Imm:  Negative    Endocrine:  Positive for thyroid  disorder    Physical Exam:  Vitals: BP (!) 161/101 (BP Location: Right arm)   Pulse 87   Wt 115.2 kg (254 lb)   SpO2 98%   BMI 41.00 kg/m        Recent Lab Results:  LIPID RESULTS:  Lab Results   Component Value Date    CHOL 164 05/15/2019    HDL 60 05/15/2019    LDL 87 05/15/2019    TRIG 84 05/15/2019    CHOLHDLRATIO 2.9 08/26/2015       LIVER ENZYME RESULTS:  Lab Results   Component Value Date    AST 15 05/15/2019    ALT 27 05/15/2019       CBC RESULTS:  Lab Results   Component Value Date    WBC 7.4 05/15/2019    RBC 4.91 05/15/2019    HGB 14.5 05/15/2019    HCT 44.4 05/15/2019    MCV 90 05/15/2019    MCH 29.5 05/15/2019    MCHC 32.7 05/15/2019    RDW 14.3 05/15/2019     05/15/2019       BMP RESULTS:  Lab Results   Component Value Date     05/15/2019    POTASSIUM 3.6 05/15/2019    CHLORIDE 107 05/15/2019    CO2 28 05/15/2019    ANIONGAP 3 05/15/2019     (H) 05/15/2019    BUN 14 05/15/2019    CR 0.58 05/15/2019    GFRESTIMATED >90 05/15/2019    GFRESTBLACK >90 05/15/2019    KERRY 9.0 05/15/2019        A1C RESULTS:  Lab Results   Component Value Date    A1C 6.1 (H) 07/10/2019       INR RESULTS:  No results found for: INR        CC  Anu Desai MD  [address] [place] [address]

## 2019-08-02 ENCOUNTER — HOSPITAL ENCOUNTER (OUTPATIENT)
Dept: CARDIOLOGY | Facility: CLINIC | Age: 69
Discharge: HOME | End: 2019-08-02
Attending: INTERNAL MEDICINE | Admitting: INTERNAL MEDICINE
Payer: MEDICARE

## 2019-08-02 DIAGNOSIS — R00.2 PALPITATIONS: ICD-10-CM

## 2019-08-02 DIAGNOSIS — R06.02 SOB (SHORTNESS OF BREATH): ICD-10-CM

## 2019-08-02 DIAGNOSIS — R07.9 CHEST PAIN, UNSPECIFIED TYPE: ICD-10-CM

## 2019-08-02 PROCEDURE — 0296T ZIO PATCH HOLTER ADULT PEDIATRIC GREATER THAN 48 HRS: CPT

## 2019-08-02 PROCEDURE — 0298T ZIO PATCH HOLTER ADULT PEDIATRIC GREATER THAN 48 HRS: CPT | Performed by: INTERNAL MEDICINE

## 2019-08-07 ENCOUNTER — HOSPITAL ENCOUNTER (OUTPATIENT)
Dept: PHYSICAL THERAPY | Facility: CLINIC | Age: 69
Setting detail: THERAPIES SERIES
End: 2019-08-07
Attending: FAMILY MEDICINE
Payer: MEDICARE

## 2019-08-07 PROCEDURE — 97110 THERAPEUTIC EXERCISES: CPT | Mod: GP,59 | Performed by: PHYSICAL THERAPIST

## 2019-08-07 PROCEDURE — 90911 ZZHC PT BIOFEEDBACK (PFM): CPT | Mod: GP | Performed by: PHYSICAL THERAPIST

## 2019-08-09 DIAGNOSIS — E03.9 HYPOTHYROIDISM, UNSPECIFIED TYPE: ICD-10-CM

## 2019-08-09 RX ORDER — LEVOTHYROXINE SODIUM 175 UG/1
TABLET ORAL
Qty: 90 TABLET | Refills: 1 | Status: SHIPPED | OUTPATIENT
Start: 2019-08-09 | End: 2020-02-10

## 2019-08-09 NOTE — TELEPHONE ENCOUNTER
"Requested Prescriptions   Pending Prescriptions Disp Refills     levothyroxine (SYNTHROID/LEVOTHROID) 175 MCG tablet [Pharmacy Med Name: LEVOTHYROXINE SODIUM 175MCG TABS] 30 tablet 2     Sig: TAKE ONE TABLET BY MOUTH ONCE DAILY       Thyroid Protocol Passed - 8/9/2019  9:32 AM        Passed - Patient is 12 years or older        Passed - Recent (12 mo) or future (30 days) visit within the authorizing provider's specialty     Patient had office visit in the last 12 months or has a visit in the next 30 days with authorizing provider or within the authorizing provider's specialty.  See \"Patient Info\" tab in inbasket, or \"Choose Columns\" in Meds & Orders section of the refill encounter.              Passed - Medication is active on med list        Passed - Normal TSH on file in past 12 months     Recent Labs   Lab Test 07/10/19  0956   TSH 0.47              Passed - No active pregnancy on record     If patient is pregnant or has had a positive pregnancy test, please check TSH.          Passed - No positive pregnancy test in past 12 months     If patient is pregnant or has had a positive pregnancy test, please check TSH.        levothyroxine (SYNTHROID/LEVOTHROID) 175 MCG tablet  Last Written Prescription Date:  05/17/2019  Last Fill Quantity: 30 tablet,  # refills: 2   Last office visit: 6/21/2019 with prescribing provider:  SANNA Desai   Future Office Visit:   Next 5 appointments (look out 90 days)    Sep 10, 2019  9:30 AM CDT  Return Visit with Marlin Benjamin MD  [facility] ([facility] [address] [place] [id number] [phone]         Abril WEIR (R) (M)      "

## 2019-08-19 ENCOUNTER — HOSPITAL ENCOUNTER (OUTPATIENT)
Dept: CARDIOLOGY | Facility: CLINIC | Age: 69
End: 2019-08-19
Attending: INTERNAL MEDICINE
Payer: MEDICARE

## 2019-08-19 ENCOUNTER — HOSPITAL ENCOUNTER (OUTPATIENT)
Dept: CARDIOLOGY | Facility: CLINIC | Age: 69
Discharge: HOME | End: 2019-08-19
Attending: INTERNAL MEDICINE | Admitting: INTERNAL MEDICINE
Payer: MEDICARE

## 2019-08-19 VITALS — SYSTOLIC BLOOD PRESSURE: 140 MMHG | HEART RATE: 63 BPM | DIASTOLIC BLOOD PRESSURE: 80 MMHG

## 2019-08-19 DIAGNOSIS — R00.2 PALPITATIONS: ICD-10-CM

## 2019-08-19 DIAGNOSIS — R06.02 SOB (SHORTNESS OF BREATH): ICD-10-CM

## 2019-08-19 DIAGNOSIS — R93.1 ABNORMAL ECHOCARDIOGRAM: ICD-10-CM

## 2019-08-19 DIAGNOSIS — R07.9 CHEST PAIN, UNSPECIFIED TYPE: ICD-10-CM

## 2019-08-19 PROCEDURE — 25000128 H RX IP 250 OP 636: Performed by: INTERNAL MEDICINE

## 2019-08-19 PROCEDURE — 75574 CT ANGIO HRT W/3D IMAGE: CPT

## 2019-08-19 PROCEDURE — 75574 CT ANGIO HRT W/3D IMAGE: CPT | Mod: 26 | Performed by: INTERNAL MEDICINE

## 2019-08-19 PROCEDURE — 71275 CT ANGIOGRAPHY CHEST: CPT

## 2019-08-19 PROCEDURE — 25000132 ZZH RX MED GY IP 250 OP 250 PS 637: Mod: GY | Performed by: INTERNAL MEDICINE

## 2019-08-19 PROCEDURE — 25000125 ZZHC RX 250: Performed by: INTERNAL MEDICINE

## 2019-08-19 PROCEDURE — 71275 CT ANGIOGRAPHY CHEST: CPT | Mod: 26 | Performed by: INTERNAL MEDICINE

## 2019-08-19 RX ORDER — METOPROLOL TARTRATE 50 MG
50-100 TABLET ORAL
Status: DISCONTINUED | OUTPATIENT
Start: 2019-08-19 | End: 2019-08-20 | Stop reason: HOSPADM

## 2019-08-19 RX ORDER — ACYCLOVIR 200 MG/1
0-1 CAPSULE ORAL
Status: DISCONTINUED | OUTPATIENT
Start: 2019-08-19 | End: 2019-08-20 | Stop reason: HOSPADM

## 2019-08-19 RX ORDER — IOPAMIDOL 755 MG/ML
500 INJECTION, SOLUTION INTRAVASCULAR ONCE
Status: COMPLETED | OUTPATIENT
Start: 2019-08-19 | End: 2019-08-19

## 2019-08-19 RX ORDER — ONDANSETRON 2 MG/ML
4 INJECTION INTRAMUSCULAR; INTRAVENOUS
Status: DISCONTINUED | OUTPATIENT
Start: 2019-08-19 | End: 2019-08-20 | Stop reason: HOSPADM

## 2019-08-19 RX ORDER — METHYLPREDNISOLONE SODIUM SUCCINATE 125 MG/2ML
125 INJECTION, POWDER, LYOPHILIZED, FOR SOLUTION INTRAMUSCULAR; INTRAVENOUS
Status: DISCONTINUED | OUTPATIENT
Start: 2019-08-19 | End: 2019-08-20 | Stop reason: HOSPADM

## 2019-08-19 RX ORDER — NITROGLYCERIN 0.4 MG/1
0.4 TABLET SUBLINGUAL
Status: DISCONTINUED | OUTPATIENT
Start: 2019-08-19 | End: 2019-08-20 | Stop reason: HOSPADM

## 2019-08-19 RX ORDER — DIPHENHYDRAMINE HCL 25 MG
25 CAPSULE ORAL
Status: DISCONTINUED | OUTPATIENT
Start: 2019-08-19 | End: 2019-08-20 | Stop reason: HOSPADM

## 2019-08-19 RX ORDER — METOPROLOL TARTRATE 1 MG/ML
5-15 INJECTION, SOLUTION INTRAVENOUS
Status: DISCONTINUED | OUTPATIENT
Start: 2019-08-19 | End: 2019-08-20 | Stop reason: HOSPADM

## 2019-08-19 RX ORDER — DIPHENHYDRAMINE HYDROCHLORIDE 50 MG/ML
25-50 INJECTION INTRAMUSCULAR; INTRAVENOUS
Status: DISCONTINUED | OUTPATIENT
Start: 2019-08-19 | End: 2019-08-20 | Stop reason: HOSPADM

## 2019-08-19 RX ADMIN — IOPAMIDOL 125 ML: 755 INJECTION, SOLUTION INTRAVENOUS at 10:53

## 2019-08-19 RX ADMIN — NITROGLYCERIN 0.4 MG: 0.4 TABLET SUBLINGUAL at 10:39

## 2019-08-19 RX ADMIN — METOPROLOL TARTRATE 2.5 MG: 5 INJECTION INTRAVENOUS at 10:44

## 2019-08-19 RX ADMIN — SODIUM CHLORIDE 100 ML: 9 INJECTION, SOLUTION INTRAVENOUS at 10:53

## 2019-08-19 NOTE — RESULT ENCOUNTER NOTE
Results noted: calcified granulomas bilaterally as well as calcified hilar and mediastinal lymph nodes. To be discussed at OV with Dr Benjamin on 9/10/19. Will discuss with Dr Benjamin for recommendations prior to that visit.

## 2019-08-19 NOTE — RESULT ENCOUNTER NOTE
Results noted: total calcium score 0, To be discussed at OV with Dr Benjamin on 9/10/19. Pt notified of results via her MyChart account

## 2019-08-19 NOTE — RESULT ENCOUNTER NOTE
Results noted: mildly dilated ascending aorta at 4.25 cm x 4.13 cm. To be discussed at OV with Dr Benjamin on 9/10/19. Pt notified of results via her MyChart account

## 2019-08-20 NOTE — RESULT ENCOUNTER NOTE
Pt notified of results and recommendations via her [employer] account. Results routed to PCP (Dr Sharma) as well.

## 2019-08-23 NOTE — RESULT ENCOUNTER NOTE
Results noted: sinus  with avg HR 80; 1 run NSVT lasting 4 beats; 37 runs SVT with longest lasting 27.3 seconds. To be discussed at  with Dr Benjamin on 9/10/19

## 2019-09-10 ENCOUNTER — OFFICE VISIT (OUTPATIENT)
Dept: CARDIOLOGY | Facility: CLINIC | Age: 69
End: 2019-09-10
Attending: INTERNAL MEDICINE
Payer: COMMERCIAL

## 2019-09-10 VITALS
HEART RATE: 75 BPM | DIASTOLIC BLOOD PRESSURE: 98 MMHG | WEIGHT: 252 LBS | SYSTOLIC BLOOD PRESSURE: 149 MMHG | BODY MASS INDEX: 40.67 KG/M2 | OXYGEN SATURATION: 98 %

## 2019-09-10 DIAGNOSIS — R06.02 SOB (SHORTNESS OF BREATH): ICD-10-CM

## 2019-09-10 DIAGNOSIS — R00.2 PALPITATIONS: ICD-10-CM

## 2019-09-10 DIAGNOSIS — N32.81 OVERACTIVE BLADDER: ICD-10-CM

## 2019-09-10 DIAGNOSIS — R07.9 CHEST PAIN, UNSPECIFIED TYPE: ICD-10-CM

## 2019-09-10 PROCEDURE — 99214 OFFICE O/P EST MOD 30 MIN: CPT | Performed by: INTERNAL MEDICINE

## 2019-09-10 NOTE — PATIENT INSTRUCTIONS
"[facility]  [facility]~[address]~[place], [place]~[address]  Thank you for your [facility] visit today. If you have questions regarding your visit, please contact your cardiology RN, Arlet Youngblood, at [phone].    Please arrive 15 minutes early to all cardiology appointments.    To schedule a future appointment, we kindly ask that you call cardiology scheduling at [phone] three months prior to requested revisit date.  [facility] cardiology clinic is staffed with \"Advance Practice Providers\". These are our cardiology Physician Assistants and Nurse Practitioners.   Please call cardiology scheduling if you feel you need clinical evaluation with them at any time for any cardiac reason.     Reminder:  For your safety, we ask that you bring in your current medication(s) or an updated list of your medications with you to EACH office visit. Include the medication name, dose of pill on bottle and how you are taking it. Include over-the-counter medications or supplements. Your provider will review this at each visit and plan your care based on your current information.   ~~~~~~~~~~~~~~~~~~~~~~~~~~~~~~~~~~~~~~~  \"[facility]\" campus telephone numbers for reference:  Cardiology Scheduling~[phone]  Diagnostic Imaging Scheduling~[phone]  Lab Scheduling~[phone]  Anticoagulation Clinic~[phone]  Cardiac Rehabilitation~[phone]  CORE Clinic RN's~[phone] (at [facility])  Congential Team [phone] (at [facility])  Cardiology Clinic RN's~[phone] (Arlet Youngblood, RN)  ~~~~~~~~~~~~~~~~~~~~~~~~~~~~~~~~~~~~~~~~        "

## 2019-09-10 NOTE — LETTER
9/10/2019      Anu Desai MD  [address] [place] [id number]      RE: Audelia Aceves       Dear Colleague,    I had the pleasure of seeing Audelia Aceves in the [facility].    Service Date: 09/10/2019      CARDIOLOGY CLINIC CONSULTATION       REASON FOR VISIT:  Followup on testing results of a recent chest pain episode.      HISTORY OF PRESENT ILLNESS:  This is a delightful 69-year-old female who is here with her significant other and one of the daughters.  The other daughter is a nurse and could not make it to the appointment today.  The patient was seen by me in 08/2019 for chest discomfort that was not very related to activity.  She has a history of hypertension for which she takes losartan and amlodipine in addition to aspirin and statin.  No prior cardiovascular history.  She actually had gone to PCP recently complaining of some worsening shortness of breath and palpitations when she had a Zio Patch monitor that showed 7% burden of PVCs and 2 short runs of nonsustained VT.  Some runs of SVT were also noted.  The patient did not have events during these episodes.  Echo did show normal LV function and mild aortic stenosis but there was a possibility of a bicuspid aortic valve with an aorta that measured 43 mm.  Given these findings, she was seen by me.  Subsequently, she underwent a coronary CT angiogram which showed trivial coronary disease with a calcium score of 0 and showed that the aorta was indeed mildly enlarged at 43 mm.  We initiated beta blocker therapy and her home blood pressure readings are in the 130s to 140s now.  She occasionally continues to miss her carvedilol dose at night.  This morning showed her blood pressure was 149/98 and she missed her dose last night and had just taken her morning medications an hour ago.  She had a repeat Zio Patch monitor which showed that the PVC burden has gone down to less than 2%.  There was a total of 37  runs of supraventricular tachycardia, the longest was up to 27.3 seconds.  No atrial fibrillation documented on that Zio.  Per Dr. Jenkins, the runs of SVT are potentially atrial tachycardia with variable block.  In any case, today, she is here for followup.  She continues to feel well, except for some lethargy and fatigue.  No classic symptoms of angina.  No syncope, presyncope.      PHYSICAL EXAMINATION:   VITAL SIGNS:  Blood pressure today is 149/98 with a pulse of 75.   GENERAL:  Alert and oriented x3, in no acute distress.   NECK:  Supple.  JVP is normal.   LUNGS:  Clear.   CARDIAC:  Sounds are regular with a soft systolic murmur without rubs or gallops.   EXTREMITIES:  Warm.  There is trace bilateral edema.   PSYCHIATRIC:  Appropriate affect.   ABDOMEN:  Nontender.      CURRENT MEDICATIONS:   1.  Aspirin 81.    2.  Amlodipine 10 mg.   3.  Carvedilol 6.25 b.i.d.   4.  Losartan 100 mg daily.   5.  Lovastatin 40 mg daily.     6.  Other noncardiac meds.      ASSESSMENT AND PLAN:  Ms. Aceves is overall doing well.  Clinically, no signs of heart failure.  CT coronary angiogram is negative except for trivial coronary disease.  Continue statin therapy.  Keep LDL below 100, ideally less than 70 if possible.  Mild aortic stenosis.  We will follow up with an echocardiogram in 1 year.  I have told her family members to be screened for possible bicuspid aortic valve.  We will probably get a CT or an MRA of her chest in the next couple of years to follow up on the aorta depending on what the echo shows.  PVCs are asymptomatic and burden has gone down on beta blocker therapy.  Continue current regimen.  I have told her to follow up with me in a year for aortic stenosis and she would like to get her blood pressure checked with her PCP in about 1-2 months and we can see her in Cardiology Clinic in 6 months for her blood pressure check as well.  If anything changes clinically, I have told her not hesitate to contact the clinic  immediately.  She needs to exercise, lose weight and condition her muscles to be more active.       cc:   Anu Desai MD    [place] [id number] [place] [address]         SIRENA BENJAMIN MD             D: 09/10/2019   T: 09/10/2019   MT: DW      Name:     ORLIN GRANT   MRN:      [id number]        Account:      [id number]   :      1950           Service Date: 09/10/2019      Document: [id number]         Outpatient Encounter Medications as of 9/10/2019   Medication Sig Dispense Refill     amLODIPine (NORVASC) 10 MG tablet Take 1 tablet (10 mg) by mouth daily 90 tablet 3     aspirin 81 MG tablet Take by mouth daily       calcium 600-200 MG-UNIT TABS Take 1 tablet by mouth daily 90 tablet 1     carvedilol (COREG) 6.25 MG tablet Take 1 tablet (6.25 mg) by mouth 2 times daily (with meals) 60 tablet 3     Cholecalciferol (VITAMIN D3) 2000 UNITS CAPS Take 1 capsule by mouth       Glucosamine HCl 1000 MG TABS Take 1 tablet by mouth 2 times daily.       levothyroxine (SYNTHROID/LEVOTHROID) 175 MCG tablet TAKE ONE TABLET BY MOUTH ONCE DAILY 90 tablet 1     losartan (COZAAR) 100 MG tablet Take 1 tablet (100 mg) by mouth daily 90 tablet 3     lovastatin (MEVACOR) 40 MG tablet TAKE ONE TABLET BY MOUTH AT BEDTIME 90 tablet 3     meloxicam (MOBIC) 15 MG tablet Take 0.5 tablets (7.5 mg) by mouth 2 times daily as needed for moderate pain 90 tablet 3     omeprazole (PRILOSEC) 20 MG DR capsule TAKE ONE CAPSULE BY MOUTH ONCE DAILY 20 - 60 MINUTES BEFORE A MEAL 90 capsule 3     [DISCONTINUED] trospium (SANCTURA) 20 MG tablet Take 1 tablet (20 mg) by mouth 2 times daily Before meals. DUE FOR APPOINTMENT 2019. NO FURTHER REFILLS 60 tablet 0     No facility-administered encounter medications on file as of 9/10/2019.        Again, thank you for allowing me to participate in the care of your patient.      Sincerely,    Sirena Benjamin MD     [facility]  Care

## 2019-09-10 NOTE — LETTER
9/10/2019    Anu Desai MD  [address] [place] [id number]    RE: Audelia Aceves       Dear Colleague,    I had the pleasure of seeing Audelia Aceves in the [facility].    HPI and Plan:   See dictation [id number]    Orders Placed This Encounter   Procedures     Follow-Up with Cardiologist     No orders of the defined types were placed in this encounter.    There are no discontinued medications.      Encounter Diagnoses   Name Primary?     Chest pain, unspecified type      SOB (shortness of breath)      Palpitations        CURRENT MEDICATIONS:  Current Outpatient Medications   Medication Sig Dispense Refill     amLODIPine (NORVASC) 10 MG tablet Take 1 tablet (10 mg) by mouth daily 90 tablet 3     aspirin 81 MG tablet Take by mouth daily       calcium 600-200 MG-UNIT TABS Take 1 tablet by mouth daily 90 tablet 1     carvedilol (COREG) 6.25 MG tablet Take 1 tablet (6.25 mg) by mouth 2 times daily (with meals) 60 tablet 3     Cholecalciferol (VITAMIN D3) 2000 UNITS CAPS Take 1 capsule by mouth       Glucosamine HCl 1000 MG TABS Take 1 tablet by mouth 2 times daily.       levothyroxine (SYNTHROID/LEVOTHROID) 175 MCG tablet TAKE ONE TABLET BY MOUTH ONCE DAILY 90 tablet 1     losartan (COZAAR) 100 MG tablet Take 1 tablet (100 mg) by mouth daily 90 tablet 3     lovastatin (MEVACOR) 40 MG tablet TAKE ONE TABLET BY MOUTH AT BEDTIME 90 tablet 3     meloxicam (MOBIC) 15 MG tablet Take 0.5 tablets (7.5 mg) by mouth 2 times daily as needed for moderate pain 90 tablet 3     omeprazole (PRILOSEC) 20 MG DR capsule TAKE ONE CAPSULE BY MOUTH ONCE DAILY 20 - 60 MINUTES BEFORE A MEAL 90 capsule 3     trospium (SANCTURA) 20 MG tablet Take 1 tablet (20 mg) by mouth 2 times daily Before meals. DUE FOR APPOINTMENT September 2019. NO FURTHER REFILLS 60 tablet 0       ALLERGIES     Allergies   Allergen Reactions     Lisinopril Cough     Sulfa Drugs Rash       PAST MEDICAL HISTORY:  Past  Medical History:   Diagnosis Date     Abnormal Papanicolaou smear of cervix and cervical HPV      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     Endometrial polyp 7/31/2009     Major depression in complete remission (H) 3/8/2011     Sebaceous cyst 3/26/2010     Thyrotoxicosis without mention of goiter or other cause, without mention of thyrotoxic crisis or storm        PAST SURGICAL HISTORY:  Past Surgical History:   Procedure Laterality Date     ARTHROPLASTY TOE(S)  5/27/2011    Procedure:ARTHROPLASTY TOE(S); Subtalar implant-Kidner Procedure; Surgeon:LUIS DANIEL GRIDER; Location:[facility]     ARTHROSCOPY KNEE RT/LT  6-    left knee meniscal tear     COLONOSCOPY  10/13/06    1 aklub-[id number]-abeshy in 10 yrs     COLONOSCOPY N/A 6/11/2019    Procedure: COLONOSCOPY;  Surgeon: Rylan Montano MD;  Location: [facility]     GYN SURGERY       LENGTHEN TENDON ACHILLES  5/27/2011    Procedure:LENGTHEN TENDON ACHILLES; Tendon transfer-Anes. consult-block       SURGICAL HISTORY OF -   1982    Tubal ligation     SURGICAL HISTORY OF -       uterine polyp removal     SURGICAL HISTORY OF -       left foot surgery for bunionette       FAMILY HISTORY:  Family History   Problem Relation Age of Onset     Hypertension Mother      Lipids Mother      Diabetes Mother      Myocardial Infarction Mother      Hypertension Brother      Blood Disease Daughter         neutropenia     Thyroid Disease Sister      Musculoskeletal Disorder Sister         fibromyalia     Gastrointestinal Disease Son         ibs     Depression Daughter      Breast Cancer No family hx of      Colon Cancer No family hx of        SOCIAL HISTORY:  Social History     Socioeconomic History     Marital status:      Spouse name: Jacob     Number of children: 4     Years of education: 15     Highest education level: None   Occupational History     Occupation:      Employer: [employer]   Social Needs     Financial resource strain: None      Food insecurity:     Worry: None     Inability: None     Transportation needs:     Medical: None     Non-medical: None   Tobacco Use     Smoking status: Never Smoker     Smokeless tobacco: Never Used   Substance and Sexual Activity     Alcohol use: Yes     Comment: occ     Drug use: No     Sexual activity: Yes     Partners: Male   Lifestyle     Physical activity:     Days per week: None     Minutes per session: None     Stress: None   Relationships     Social connections:     Talks on phone: None     Gets together: None     Attends Anabaptist service: None     Active member of club or organization: None     Attends meetings of clubs or organizations: None     Relationship status: None     Intimate partner violence:     Fear of current or ex partner: None     Emotionally abused: None     Physically abused: None     Forced sexual activity: None   Other Topics Concern      Service No     Blood Transfusions No     Caffeine Concern No     Comment: 2-4 half decaf     Occupational Exposure No     Hobby Hazards No     Sleep Concern Yes     Comment: frequent waking 2-3 times a night, trouble going back to sleep     Stress Concern Yes     Weight Concern Yes     Special Diet No     Back Care No     Exercise No     Comment: not often     Bike Helmet No     Seat Belt Yes     Self-Exams No     Comment: recommendation     Parent/sibling w/ CABG, MI or angioplasty before 65F 55M? No   Social History Narrative     None       Review of Systems:  Skin:  Negative     Eyes:  Positive for glasses  ENT:  Negative    Respiratory:  Positive for shortness of breath  Cardiovascular:    edema;Positive for;chest pain  Gastroenterology: Positive for heartburn  Genitourinary:  Negative    Musculoskeletal:  Negative    Neurologic:  Positive for numbness or tingling of hands;numbness or tingling of feet;headaches  Psychiatric:  Positive for depression  Heme/Lymph/Imm:  Negative    Endocrine:  Positive for thyroid disorder    Physical  Exam:  Vitals: BP (!) 149/98   Pulse 75   Wt 114.3 kg (252 lb)   SpO2 98%   BMI 40.67 kg/m       Recent Lab Results:  LIPID RESULTS:  Lab Results   Component Value Date    CHOL 164 05/15/2019    HDL 60 05/15/2019    LDL 87 05/15/2019    TRIG 84 05/15/2019    CHOLHDLRATIO 2.9 08/26/2015       LIVER ENZYME RESULTS:  Lab Results   Component Value Date    AST 15 05/15/2019    ALT 27 05/15/2019       CBC RESULTS:  Lab Results   Component Value Date    WBC 7.4 05/15/2019    RBC 4.91 05/15/2019    HGB 14.5 05/15/2019    HCT 44.4 05/15/2019    MCV 90 05/15/2019    MCH 29.5 05/15/2019    MCHC 32.7 05/15/2019    RDW 14.3 05/15/2019     05/15/2019       BMP RESULTS:  Lab Results   Component Value Date     05/15/2019    POTASSIUM 3.6 05/15/2019    CHLORIDE 107 05/15/2019    CO2 28 05/15/2019    ANIONGAP 3 05/15/2019     (H) 05/15/2019    BUN 14 05/15/2019    CR 0.58 05/15/2019    GFRESTIMATED >90 05/15/2019    GFRESTBLACK >90 05/15/2019    KERRY 9.0 05/15/2019        A1C RESULTS:  Lab Results   Component Value Date    A1C 6.1 (H) 07/10/2019       INR RESULTS:  No results found for: INR        CC  Marlin Benjamin MD  [address]  [place] [id number]                    Thank you for allowing me to participate in the care of your patient.      Sincerely,     Marlin Benjamin MD     [facility]    cc:   Marlin Benjamin MD  [address]  [place] [phone]

## 2019-09-10 NOTE — PROGRESS NOTES
HPI and Plan:   See dictation [id number]    Orders Placed This Encounter   Procedures     Follow-Up with Cardiologist     No orders of the defined types were placed in this encounter.    There are no discontinued medications.      Encounter Diagnoses   Name Primary?     Chest pain, unspecified type      SOB (shortness of breath)      Palpitations        CURRENT MEDICATIONS:  Current Outpatient Medications   Medication Sig Dispense Refill     amLODIPine (NORVASC) 10 MG tablet Take 1 tablet (10 mg) by mouth daily 90 tablet 3     aspirin 81 MG tablet Take by mouth daily       calcium 600-200 MG-UNIT TABS Take 1 tablet by mouth daily 90 tablet 1     carvedilol (COREG) 6.25 MG tablet Take 1 tablet (6.25 mg) by mouth 2 times daily (with meals) 60 tablet 3     Cholecalciferol (VITAMIN D3) 2000 UNITS CAPS Take 1 capsule by mouth       Glucosamine HCl 1000 MG TABS Take 1 tablet by mouth 2 times daily.       levothyroxine (SYNTHROID/LEVOTHROID) 175 MCG tablet TAKE ONE TABLET BY MOUTH ONCE DAILY 90 tablet 1     losartan (COZAAR) 100 MG tablet Take 1 tablet (100 mg) by mouth daily 90 tablet 3     lovastatin (MEVACOR) 40 MG tablet TAKE ONE TABLET BY MOUTH AT BEDTIME 90 tablet 3     meloxicam (MOBIC) 15 MG tablet Take 0.5 tablets (7.5 mg) by mouth 2 times daily as needed for moderate pain 90 tablet 3     omeprazole (PRILOSEC) 20 MG DR capsule TAKE ONE CAPSULE BY MOUTH ONCE DAILY 20 - 60 MINUTES BEFORE A MEAL 90 capsule 3     trospium (SANCTURA) 20 MG tablet Take 1 tablet (20 mg) by mouth 2 times daily Before meals. DUE FOR APPOINTMENT September 2019. NO FURTHER REFILLS 60 tablet 0       ALLERGIES     Allergies   Allergen Reactions     Lisinopril Cough     Sulfa Drugs Rash       PAST MEDICAL HISTORY:  Past Medical History:   Diagnosis Date     Abnormal Papanicolaou smear of cervix and cervical HPV      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     Endometrial polyp 7/31/2009     Major depression in complete remission (H) 3/8/2011      Sebaceous cyst 3/26/2010     Thyrotoxicosis without mention of goiter or other cause, without mention of thyrotoxic crisis or storm        PAST SURGICAL HISTORY:  Past Surgical History:   Procedure Laterality Date     ARTHROPLASTY TOE(S)  5/27/2011    Procedure:ARTHROPLASTY TOE(S); Subtalar implant-Kidner Procedure; Surgeon:LUIS DANIEL GRIDER; Location:[place] [facility]     ARTHROSCOPY KNEE RT/LT  6-    left knee meniscal tear     COLONOSCOPY  10/13/06    1 [id number]-[id number]-tcaebs in 10 yrs     COLONOSCOPY N/A 6/11/2019    Procedure: COLONOSCOPY;  Surgeon: Rylan Montano MD;  Location: [facility]     GYN SURGERY       LENGTHEN TENDON ACHILLES  5/27/2011    Procedure:LENGTHEN TENDON ACHILLES; Tendon transfer-Anes. consult-block       SURGICAL HISTORY OF -   1982    Tubal ligation     SURGICAL HISTORY OF -       uterine polyp removal     SURGICAL HISTORY OF -       left foot surgery for bunionette       FAMILY HISTORY:  Family History   Problem Relation Age of Onset     Hypertension Mother      Lipids Mother      Diabetes Mother      Myocardial Infarction Mother      Hypertension Brother      Blood Disease Daughter         neutropenia     Thyroid Disease Sister      Musculoskeletal Disorder Sister         fibromyalia     Gastrointestinal Disease Son         ibs     Depression Daughter      Breast Cancer No family hx of      Colon Cancer No family hx of        SOCIAL HISTORY:  Social History     Socioeconomic History     Marital status:      Spouse name: Jacob     Number of children: 4     Years of education: 15     Highest education level: None   Occupational History     Occupation:      Employer: [employer]   Social Needs     Financial resource strain: None     Food insecurity:     Worry: None     Inability: None     Transportation needs:     Medical: None     Non-medical: None   Tobacco Use     Smoking status: Never Smoker     Smokeless tobacco: Never Used   Substance and Sexual Activity      Alcohol use: Yes     Comment: occ     Drug use: No     Sexual activity: Yes     Partners: Male   Lifestyle     Physical activity:     Days per week: None     Minutes per session: None     Stress: None   Relationships     Social connections:     Talks on phone: None     Gets together: None     Attends Congregational service: None     Active member of club or organization: None     Attends meetings of clubs or organizations: None     Relationship status: None     Intimate partner violence:     Fear of current or ex partner: None     Emotionally abused: None     Physically abused: None     Forced sexual activity: None   Other Topics Concern      Service No     Blood Transfusions No     Caffeine Concern No     Comment: 2-4 half decaf     Occupational Exposure No     Hobby Hazards No     Sleep Concern Yes     Comment: frequent waking 2-3 times a night, trouble going back to sleep     Stress Concern Yes     Weight Concern Yes     Special Diet No     Back Care No     Exercise No     Comment: not often     Bike Helmet No     Seat Belt Yes     Self-Exams No     Comment: recommendation     Parent/sibling w/ CABG, MI or angioplasty before 65F 55M? No   Social History Narrative     None       Review of Systems:  Skin:  Negative     Eyes:  Positive for glasses  ENT:  Negative    Respiratory:  Positive for shortness of breath  Cardiovascular:    edema;Positive for;chest pain  Gastroenterology: Positive for heartburn  Genitourinary:  Negative    Musculoskeletal:  Negative    Neurologic:  Positive for numbness or tingling of hands;numbness or tingling of feet;headaches  Psychiatric:  Positive for depression  Heme/Lymph/Imm:  Negative    Endocrine:  Positive for thyroid disorder    Physical Exam:  Vitals: BP (!) 149/98   Pulse 75   Wt 114.3 kg (252 lb)   SpO2 98%   BMI 40.67 kg/m      Recent Lab Results:  LIPID RESULTS:  Lab Results   Component Value Date    CHOL 164 05/15/2019    HDL 60 05/15/2019    LDL 87 05/15/2019     TRIG 84 05/15/2019    CHOLHDLRATIO 2.9 08/26/2015       LIVER ENZYME RESULTS:  Lab Results   Component Value Date    AST 15 05/15/2019    ALT 27 05/15/2019       CBC RESULTS:  Lab Results   Component Value Date    WBC 7.4 05/15/2019    RBC 4.91 05/15/2019    HGB 14.5 05/15/2019    HCT 44.4 05/15/2019    MCV 90 05/15/2019    MCH 29.5 05/15/2019    MCHC 32.7 05/15/2019    RDW 14.3 05/15/2019     05/15/2019       BMP RESULTS:  Lab Results   Component Value Date     05/15/2019    POTASSIUM 3.6 05/15/2019    CHLORIDE 107 05/15/2019    CO2 28 05/15/2019    ANIONGAP 3 05/15/2019     (H) 05/15/2019    BUN 14 05/15/2019    CR 0.58 05/15/2019    GFRESTIMATED >90 05/15/2019    GFRESTBLACK >90 05/15/2019    KERRY 9.0 05/15/2019        A1C RESULTS:  Lab Results   Component Value Date    A1C 6.1 (H) 07/10/2019       INR RESULTS:  No results found for: INR        CC  Marlin Benjamin MD  [address]  [place] [id number]

## 2019-09-10 NOTE — PROGRESS NOTES
Service Date: 09/10/2019      CARDIOLOGY CLINIC CONSULTATION       REASON FOR VISIT:  Followup on testing results of a recent chest pain episode.      HISTORY OF PRESENT ILLNESS:  This is a delightful 69-year-old female who is here with her significant other and one of the daughters.  The other daughter is a nurse and could not make it to the appointment today.  The patient was seen by me in 08/2019 for chest discomfort that was not very related to activity.  She has a history of hypertension for which she takes losartan and amlodipine in addition to aspirin and statin.  No prior cardiovascular history.  She actually had gone to PCP recently complaining of some worsening shortness of breath and palpitations when she had a Zio Patch monitor that showed 7% burden of PVCs and 2 short runs of nonsustained VT.  Some runs of SVT were also noted.  The patient did not have events during these episodes.  Echo did show normal LV function and mild aortic stenosis but there was a possibility of a bicuspid aortic valve with an aorta that measured 43 mm.  Given these findings, she was seen by me.  Subsequently, she underwent a coronary CT angiogram which showed trivial coronary disease with a calcium score of 0 and showed that the aorta was indeed mildly enlarged at 43 mm.  We initiated beta blocker therapy and her home blood pressure readings are in the 130s to 140s now.  She occasionally continues to miss her carvedilol dose at night.  This morning showed her blood pressure was 149/98 and she missed her dose last night and had just taken her morning medications an hour ago.  She had a repeat Zio Patch monitor which showed that the PVC burden has gone down to less than 2%.  There was a total of 37 runs of supraventricular tachycardia, the longest was up to 27.3 seconds.  No atrial fibrillation documented on that Zio.  Per Dr. Jenkins, the runs of SVT are potentially atrial tachycardia with variable block.  In any case, today, she is  here for followup.  She continues to feel well, except for some lethargy and fatigue.  No classic symptoms of angina.  No syncope, presyncope.      PHYSICAL EXAMINATION:   VITAL SIGNS:  Blood pressure today is 149/98 with a pulse of 75.   GENERAL:  Alert and oriented x3, in no acute distress.   NECK:  Supple.  JVP is normal.   LUNGS:  Clear.   CARDIAC:  Sounds are regular with a soft systolic murmur without rubs or gallops.   EXTREMITIES:  Warm.  There is trace bilateral edema.   PSYCHIATRIC:  Appropriate affect.   ABDOMEN:  Nontender.      CURRENT MEDICATIONS:   1.  Aspirin 81.    2.  Amlodipine 10 mg.   3.  Carvedilol 6.25 b.i.d.   4.  Losartan 100 mg daily.   5.  Lovastatin 40 mg daily.     6.  Other noncardiac meds.      ASSESSMENT AND PLAN:  Ms. Aceves is overall doing well.  Clinically, no signs of heart failure.  CT coronary angiogram is negative except for trivial coronary disease.  Continue statin therapy.  Keep LDL below 100, ideally less than 70 if possible.  Mild aortic stenosis.  We will follow up with an echocardiogram in 1 year.  I have told her family members to be screened for possible bicuspid aortic valve.  We will probably get a CT or an MRA of her chest in the next couple of years to follow up on the aorta depending on what the echo shows.  PVCs are asymptomatic and burden has gone down on beta blocker therapy.  Continue current regimen.  I have told her to follow up with me in a year for aortic stenosis and she would like to get her blood pressure checked with her PCP in about 1-2 months and we can see her in Cardiology Clinic in 6 months for her blood pressure check as well.  If anything changes clinically, I have told her not hesitate to contact the clinic immediately.  She needs to exercise, lose weight and condition her muscles to be more active.       cc:   Anu Desai MD    [facility]    [address] [address]         SIRENA DENT MD              D: 09/10/2019   T: 09/10/2019   MT: MICHAEL      Name:     ORLIN GRANT   MRN:      [id number]        Account:      [id number]   :      1950           Service Date: 09/10/2019      Document: [id number]

## 2019-09-11 RX ORDER — TROSPIUM CHLORIDE 20 MG/1
20 TABLET, FILM COATED ORAL 2 TIMES DAILY
Qty: 60 TABLET | Refills: 5 | Status: SHIPPED | OUTPATIENT
Start: 2019-09-11 | End: 2019-10-10

## 2019-09-11 NOTE — TELEPHONE ENCOUNTER
"Requested Prescriptions   Pending Prescriptions Disp Refills     trospium (SANCTURA) 20 MG tablet [Pharmacy Med Name: TROSPIUM CHLORIDE 20MG TABS]  Last Written Prescription Date:  09/02/19  Last Fill Quantity: 60,  # refills: 0   Last office visit: 05/20/19 with prescribing provider:  ALESIA More   Future Office Visit:     60 tablet 0     Sig: TAKE 1 TABLET BY MOUTH TWO TIMES A DAY BEFORE MEALS. DUE FOR APPOINTMENT SEPTEMBER 2019- NO FURTHER REFILLS.       Muscarinic Antagonists (Urinary Incontinence Agents) Passed - 9/10/2019  6:43 PM        Passed - Recent (12 mo) or future (30 days) visit within the authorizing provider's specialty     Patient had office visit in the last 12 months or has a visit in the next 30 days with authorizing provider or within the authorizing provider's specialty.  See \"Patient Info\" tab in inbasket, or \"Choose Columns\" in Meds & Orders section of the refill encounter.              Passed - Patient does not have a diagnosis of glaucoma on the problem list     If glaucoma diagnosis is new, refer refill to physician.          Passed - Medication is active on med list        Passed - Patient is 18 years of age or older          "

## 2019-09-29 ENCOUNTER — HEALTH MAINTENANCE LETTER (OUTPATIENT)
Age: 69
End: 2019-09-29

## 2019-10-10 ENCOUNTER — OFFICE VISIT (OUTPATIENT)
Dept: FAMILY MEDICINE | Facility: CLINIC | Age: 69
End: 2019-10-10
Payer: COMMERCIAL

## 2019-10-10 VITALS
OXYGEN SATURATION: 95 % | RESPIRATION RATE: 20 BRPM | TEMPERATURE: 98.2 F | SYSTOLIC BLOOD PRESSURE: 139 MMHG | HEIGHT: 66 IN | BODY MASS INDEX: 40.66 KG/M2 | WEIGHT: 253 LBS | DIASTOLIC BLOOD PRESSURE: 89 MMHG | HEART RATE: 93 BPM

## 2019-10-10 DIAGNOSIS — N32.81 OVERACTIVE BLADDER: ICD-10-CM

## 2019-10-10 DIAGNOSIS — E03.9 HYPOTHYROIDISM, UNSPECIFIED TYPE: ICD-10-CM

## 2019-10-10 DIAGNOSIS — M15.0 PRIMARY OSTEOARTHRITIS INVOLVING MULTIPLE JOINTS: ICD-10-CM

## 2019-10-10 DIAGNOSIS — Z00.00 MEDICARE ANNUAL WELLNESS VISIT, SUBSEQUENT: ICD-10-CM

## 2019-10-10 DIAGNOSIS — Q23.81 BICUSPID AORTIC VALVE: ICD-10-CM

## 2019-10-10 DIAGNOSIS — R00.2 PALPITATIONS: ICD-10-CM

## 2019-10-10 DIAGNOSIS — I10 HYPERTENSION GOAL BP (BLOOD PRESSURE) < 140/90: Primary | ICD-10-CM

## 2019-10-10 PROCEDURE — 99213 OFFICE O/P EST LOW 20 MIN: CPT | Mod: 25 | Performed by: FAMILY MEDICINE

## 2019-10-10 PROCEDURE — 90662 IIV NO PRSV INCREASED AG IM: CPT | Performed by: FAMILY MEDICINE

## 2019-10-10 PROCEDURE — G0008 ADMIN INFLUENZA VIRUS VAC: HCPCS | Performed by: FAMILY MEDICINE

## 2019-10-10 PROCEDURE — 99397 PER PM REEVAL EST PAT 65+ YR: CPT | Mod: 25 | Performed by: FAMILY MEDICINE

## 2019-10-10 RX ORDER — MELOXICAM 15 MG/1
7.5-15 TABLET ORAL DAILY
Qty: 90 TABLET | Refills: 3 | Status: SHIPPED | OUTPATIENT
Start: 2019-10-10 | End: 2020-11-09

## 2019-10-10 RX ORDER — CARVEDILOL 12.5 MG/1
6.25 TABLET ORAL 2 TIMES DAILY WITH MEALS
Qty: 180 TABLET | Refills: 3 | Status: SHIPPED | OUTPATIENT
Start: 2019-10-10 | End: 2019-10-14

## 2019-10-10 ASSESSMENT — MIFFLIN-ST. JEOR: SCORE: 1699.35

## 2019-10-10 NOTE — PATIENT INSTRUCTIONS
Increase the carvedilol to 12.5 mg twice a day  (can take two twice a day until yours are used up)    See you back for preop

## 2019-10-10 NOTE — PROGRESS NOTES
Subjective     Audelia Aceves is a 69 year old female who presents to clinic today for the following health issues:    HPI   Hypertension Follow-up      Do you check your blood pressure regularly outside of the clinic? Yes     Are you following a low salt diet? No    Are your blood pressures ever more than 140 on the top number (systolic) OR more   than 90 on the bottom number (diastolic), for example 140/90? Yes    BP Readings from Last 6 Encounters:   10/10/19 139/89   09/10/19 (!) 149/98   08/19/19 (!) 140/80   08/01/19 (!) 161/101   06/21/19 138/84   06/11/19 (!) 155/94      On carvedilol, losartan, Amlodipine   Pulse Readings from Last 3 Encounters:   10/10/19 93   09/10/19 75   08/19/19 63      She saw the cardiologist on 9/10/2019, for palpitations.  Zio patch that showed 7% burden of PVCs and 2 short runs of nonsustained VT.  Also had some runs of SVT.  She had a coronary angiogram CT which showed trivial coronary artery disease with a calcium score of 0.  Aorta was mildly enlarged at 43 mm.  He initiated beta-blocker therapy at that time.  Repeat Zio patch was better. She looks to have a bicuspid aortic valve. He recommended echocardiogram in 1 year.  He wanted me to check her blood pressure again as it was a little high when she saw him.    Hyperlipidemia-on lovastatin  Recent Labs   Lab Test 05/15/19  0819 02/23/18  0749  08/26/15  0924 08/21/14  0830   CHOL 164 168   < > 183 172   HDL 60 53   < > 63 57   LDL 87 92   < > 99 97   TRIG 84 113   < > 105 92   CHOLHDLRATIO  --   --   --  2.9 3.0    < > = values in this interval not displayed.      hypothyroidism -on levothyroxine   TSH   Date Value Ref Range Status   07/10/2019 0.47 0.40 - 4.00 mU/L Final      Urinary incontinence- is on Sanct[place].  That does help some.  There is a lot less dry mouth with it.  She did do pelvic floor exercises which helps some but she does not continue to do her exercises.    Annual Wellness Visit    Are you in the first  "12 months of your Medicare Part B coverage?  No    Physical Health:    In general, how would you rate your overall physical health? good    If you drink alcohol do you typically have >3 drinks per day or >7 drinks per week? No    Do you usually eat at least 4 servings of fruit and vegetables a day, include whole grains & fiber and avoid regularly eating high fat or \"junk\" foods? Yes    Needs assistance for the following daily activities: no assistance needed    Which of the following safety concerns are present in your home?  none identified     Hearing impairment: No    In the past 6 months, have you been bothered by leaking of urine? yes    Mental Health:    In general, how would you rate your overall mental or emotional health? good  PHQ-2 Score:      Do you feel safe in your environment? Yes    Do you have a Health Care Directive? No: Advance care planning reviewed with patient; information given to patient to review.    Fall risk:       Cognitive Screenin) Repeat 3 items (Leader, Season, Table)    2) Clock draw: NORMAL  3) 3 item recall: Recalls 2 objects   Results: NORMAL clock, 1-2 items recalled: COGNITIVE IMPAIRMENT LESS LIKELY    Mini-CogTM Copyright S Alayna. Licensed by the author for use in [facility]; reprinted with permission (soob@[facility]). All rights reserved.      Current providers sharing in care for this patient include:   Patient Care Team:  Anu Sharma MD as PCP - General (Family Practice)  Anu Sharma MD as Assigned PCP        Do you have sleep apnea, excessive snoring or daytime drowsiness?: yes snores    BP Readings from Last 3 Encounters:   10/10/19 139/89   09/10/19 (!) 149/98   19 (!) 140/80    Wt Readings from Last 3 Encounters:   10/10/19 114.8 kg (253 lb)   09/10/19 114.3 kg (252 lb)   19 115.2 kg (254 lb)            Reviewed and updated as needed this visit by Provider  Tobacco  Allergies  Meds  Problems  Med Hx  Surg Hx  Fam Hx     " "    Review of Systems   ROS COMP: Constitutional, HEENT, cardiovascular, pulmonary, gi and gu systems are negative, except as otherwise noted.      Objective    /89 (BP Location: Right arm)   Pulse 93   Temp 98.2  F (36.8  C) (Tympanic)   Resp 20   Ht 1.676 m (5' 6\")   Wt 114.8 kg (253 lb)   SpO2 95%   BMI 40.84 kg/m    Body mass index is 40.84 kg/m .  Physical Exam   GENERAL: healthy, alert and no distress  EYES: ptosis of bilateral eyelids  NECK: no adenopathy, no asymmetry, masses, or scars and thyroid normal to palpation  RESP: lungs clear to auscultation - no rales, rhonchi or wheezes  BREAST: normal without masses, tenderness or nipple discharge and no palpable axillary masses or adenopathy  CV: regular rate and rhythm, normal S1 S2, 2/6 rolling systolic ejection murmur   ABDOMEN: soft, nontender, no hepatosplenomegaly, no masses and bowel sounds normal  MS: no gross musculoskeletal defects noted, no edema  SKIN: no suspicious lesions or rashes  NEURO: Normal strength and tone, mentation intact and speech normal  PSYCH: mentation appears normal, affect normal/bright          ASSESSMENT:  1. Hypertension goal BP (blood pressure) < 140/90    2. Palpitations    3. Primary osteoarthritis involving multiple joints    4. Overactive bladder    5. Hypothyroidism, unspecified type    6. Bicuspid aortic valve    7. Medicare annual wellness visit, subsequent        PLAN:  Orders Placed This Encounter     INFLUENZA (HIGH DOSE) 3 VALENT VACCINE [85865]     ADMIN INFLUENZA  (For MEDICARE Patients ONLY) []     carvedilol (COREG) 12.5 MG tablet     meloxicam (MOBIC) 15 MG tablet       Patient Instructions   Increase the carvedilol to 12.5 mg twice a day  (can take two twice a day until yours are used up)       Anu More MD       "

## 2019-10-10 NOTE — NURSING NOTE
"Chief Complaint   Patient presents with     Hypertension       Initial There were no vitals taken for this visit. Estimated body mass index is 40.67 kg/m  as calculated from the following:    Height as of 6/11/19: 1.676 m (5' 6\").    Weight as of 9/10/19: 114.3 kg (252 lb).    Patient presents to the clinic using No DME    Health Maintenance that is potentially due pending provider review:  NONE    n/a    Is there anyone who you would like to be able to receive your results? No  If yes have patient fill out LEANA    "

## 2019-10-14 ENCOUNTER — TELEPHONE (OUTPATIENT)
Dept: FAMILY MEDICINE | Facility: CLINIC | Age: 69
End: 2019-10-14

## 2019-10-14 DIAGNOSIS — R00.2 PALPITATIONS: ICD-10-CM

## 2019-10-14 RX ORDER — CARVEDILOL 12.5 MG/1
12.5 TABLET ORAL 2 TIMES DAILY WITH MEALS
Qty: 180 TABLET | Refills: 3 | Status: SHIPPED | OUTPATIENT
Start: 2019-10-14 | End: 2019-10-21

## 2019-10-14 NOTE — TELEPHONE ENCOUNTER
Reason for Call:  Medication or medication refill:    Do you use a [facility]?  Name of the pharmacy and phone number for the current request:  [facility]    Name of the medication requested: Carvedilol - Pt is calling verifying the dose. Pt thought she to start taking 12.5 mg twice daily.   Pt received script from Pharmacy with same dose.  Please Advise.      Can we leave a detailed message on this number? YES    Phone number patient can be reached at: Home number on file [phone] (home)    Best Time: Any Time      Call taken on 10/14/2019 at 9:48 AM by Yesenia Phillips

## 2019-10-21 ENCOUNTER — OFFICE VISIT (OUTPATIENT)
Dept: FAMILY MEDICINE | Facility: CLINIC | Age: 69
End: 2019-10-21
Payer: COMMERCIAL

## 2019-10-21 VITALS
TEMPERATURE: 97.4 F | SYSTOLIC BLOOD PRESSURE: 136 MMHG | OXYGEN SATURATION: 96 % | WEIGHT: 252.8 LBS | HEIGHT: 66 IN | BODY MASS INDEX: 40.63 KG/M2 | HEART RATE: 73 BPM | DIASTOLIC BLOOD PRESSURE: 82 MMHG

## 2019-10-21 DIAGNOSIS — E78.5 HYPERLIPIDEMIA LDL GOAL <130: ICD-10-CM

## 2019-10-21 DIAGNOSIS — I77.810 ASCENDING AORTA DILATATION (H): ICD-10-CM

## 2019-10-21 DIAGNOSIS — H02.403 PTOSIS OF BOTH UPPER EYELIDS: ICD-10-CM

## 2019-10-21 DIAGNOSIS — I10 HYPERTENSION GOAL BP (BLOOD PRESSURE) < 140/90: ICD-10-CM

## 2019-10-21 DIAGNOSIS — E03.9 HYPOTHYROIDISM, UNSPECIFIED TYPE: ICD-10-CM

## 2019-10-21 DIAGNOSIS — Q23.81 BICUSPID AORTIC VALVE: ICD-10-CM

## 2019-10-21 DIAGNOSIS — N32.81 OVERACTIVE BLADDER: ICD-10-CM

## 2019-10-21 DIAGNOSIS — Z01.818 PREOP GENERAL PHYSICAL EXAM: Primary | ICD-10-CM

## 2019-10-21 DIAGNOSIS — R73.03 PREDIABETES: ICD-10-CM

## 2019-10-21 DIAGNOSIS — K21.9 GASTROESOPHAGEAL REFLUX DISEASE WITHOUT ESOPHAGITIS: ICD-10-CM

## 2019-10-21 DIAGNOSIS — R00.2 PALPITATIONS: ICD-10-CM

## 2019-10-21 LAB
ALBUMIN SERPL-MCNC: 3.3 G/DL (ref 3.4–5)
ALP SERPL-CCNC: 109 U/L (ref 40–150)
ALT SERPL W P-5'-P-CCNC: 22 U/L (ref 0–50)
ANION GAP SERPL CALCULATED.3IONS-SCNC: 4 MMOL/L (ref 3–14)
AST SERPL W P-5'-P-CCNC: 16 U/L (ref 0–45)
BILIRUB SERPL-MCNC: 0.3 MG/DL (ref 0.2–1.3)
BUN SERPL-MCNC: 17 MG/DL (ref 7–30)
CALCIUM SERPL-MCNC: 9.2 MG/DL (ref 8.5–10.1)
CHLORIDE SERPL-SCNC: 107 MMOL/L (ref 94–109)
CO2 SERPL-SCNC: 28 MMOL/L (ref 20–32)
CREAT SERPL-MCNC: 0.63 MG/DL (ref 0.52–1.04)
ERYTHROCYTE [DISTWIDTH] IN BLOOD BY AUTOMATED COUNT: 13.7 % (ref 10–15)
GFR SERPL CREATININE-BSD FRML MDRD: >90 ML/MIN/{1.73_M2}
GLUCOSE SERPL-MCNC: 137 MG/DL (ref 70–99)
HBA1C MFR BLD: 6 % (ref 0–5.6)
HCT VFR BLD AUTO: 44.3 % (ref 35–47)
HGB BLD-MCNC: 14.1 G/DL (ref 11.7–15.7)
MCH RBC QN AUTO: 28.8 PG (ref 26.5–33)
MCHC RBC AUTO-ENTMCNC: 31.8 G/DL (ref 31.5–36.5)
MCV RBC AUTO: 91 FL (ref 78–100)
PLATELET # BLD AUTO: 270 10E9/L (ref 150–450)
POTASSIUM SERPL-SCNC: 3.5 MMOL/L (ref 3.4–5.3)
PROT SERPL-MCNC: 7.4 G/DL (ref 6.8–8.8)
RBC # BLD AUTO: 4.89 10E12/L (ref 3.8–5.2)
SODIUM SERPL-SCNC: 139 MMOL/L (ref 133–144)
WBC # BLD AUTO: 7.1 10E9/L (ref 4–11)

## 2019-10-21 PROCEDURE — 99215 OFFICE O/P EST HI 40 MIN: CPT | Performed by: FAMILY MEDICINE

## 2019-10-21 PROCEDURE — 36415 COLL VENOUS BLD VENIPUNCTURE: CPT | Performed by: FAMILY MEDICINE

## 2019-10-21 PROCEDURE — 83036 HEMOGLOBIN GLYCOSYLATED A1C: CPT | Performed by: FAMILY MEDICINE

## 2019-10-21 PROCEDURE — 80053 COMPREHEN METABOLIC PANEL: CPT | Performed by: FAMILY MEDICINE

## 2019-10-21 PROCEDURE — 85027 COMPLETE CBC AUTOMATED: CPT | Performed by: FAMILY MEDICINE

## 2019-10-21 RX ORDER — CARVEDILOL 12.5 MG/1
12.5 TABLET ORAL 2 TIMES DAILY WITH MEALS
Qty: 180 TABLET | Refills: 3 | Status: SHIPPED | OUTPATIENT
Start: 2019-10-21 | End: 2021-03-15

## 2019-10-21 ASSESSMENT — MIFFLIN-ST. JEOR: SCORE: 1698.44

## 2019-10-21 NOTE — PATIENT INSTRUCTIONS
Hold Mobic and aspirin now  Only blood pressure meds and levothyroxine day of surgery    Before Your Surgery      Call your surgeon if there is any change in your health. This includes signs of a cold or flu (such as a sore throat, runny nose, cough, rash or fever).    Do not smoke, drink alcohol or take over the counter medicine (unless your surgeon or primary care doctor tells you to) for the 24 hours before and after surgery.    If you take prescribed drugs: Follow your doctor s orders about which medicines to take and which to stop until after surgery.    Eating and drinking prior to surgery: follow the instructions from your surgeon    Take a shower or bath the night before surgery. Use the soap your surgeon gave you to gently clean your skin. If you do not have soap from your surgeon, use your regular soap. Do not shave or scrub the surgery site.  Wear clean pajamas and have clean sheets on your bed.

## 2019-10-21 NOTE — PROGRESS NOTES
[facility]  [address] [place] [id number]  [phone]  Dept: [phone]    PRE-OP EVALUATION:  Today's date: 10/21/2019    Audelia Aceves (: 1950) presents for pre-operative evaluation assessment as requested by Dr. Tracy Peacock.  She requires evaluation and anesthesia risk assessment prior to undergoing surgery/procedure for treatment of vision obstruction .    Proposed Surgery/ Procedure: bilateral blepharoplasty and bilateral repair of brow ptosis  Date of Surgery/ Procedure: 10/30/2019  Time of Surgery/ Procedure: 8:15 am  Hospital/Surgical Facility: [facility]  Fax number for surgical facility: [phone]  Telephone: [phone]  Primary Physician: Anu Sharma  Type of Anesthesia Anticipated: to be determined    Patient has a Health Care Directive or Living Will:  NO    Preop Questions 10/21/2019   Who is doing your surgery? henry peacock   What are you having done? eye lid   Date of Surgery/Procedure: 10/30/19   Facility or Hospital where procedure/surgery will be performed: [facility]   1.  Do you have a history of Heart attack, stroke, stent, coronary bypass surgery, or other heart surgery? No   2.  Do you ever have any pain or discomfort in your chest? UNKNOWN - has had testing; just something they are watching: heart murmur, irregular beats    3.  Do you have a history of  Heart Failure? No   4.   Are you troubled by shortness of breath when:  walking on a level surface, or up a slight hill, or at night? YES - up and down stairs  Negative stress test   5.  Do you currently have a cold, bronchitis or other respiratory infection? No   6.  Do you have a cough, shortness of breath, or wheezing? NO   7.  Do you sometimes get pains in the calves of your legs when you walk? YES - mainly at night   8. Do you or anyone in your family have previous history of blood clots? No   9.  Do you or does anyone in your family have a serious  bleeding problem such as prolonged bleeding following surgeries or cuts? No   10. Have you ever had problems with anemia or been told to take iron pills? No   11. Have you had any abnormal blood loss such as black, tarry or bloody stools, or abnormal vaginal bleeding? No   12. Have you ever had a blood transfusion? No   13. Have you or any of your relatives ever had problems with anesthesia? UNKNOWN -    14. Do you have sleep apnea, excessive snoring or daytime drowsiness? No   15. Do you have any prosthetic heart valves? No   16. Do you have prosthetic joints? No   17. Is there any chance that you may be pregnant? No         HPI:     HPI related to upcoming procedure: 4-5 years of progressively worsening ptosis that is not affecting her vision    Bicuspid valve/aortic dilation-followed by cardiology. On beta blocker, blood pressure well controlled. CTA in August.    HYPERLIPIDEMIA - Patient has a long history of significant Hyperlipidemia requiring medication for treatment with recent good control. Patient reports no problems or side effects with the medication.   Recent Labs   Lab Test 05/15/19  0819 02/23/18  0749  08/26/15  0924 08/21/14  0830   CHOL 164 168   < > 183 172   HDL 60 53   < > 63 57   LDL 87 92   < > 99 97   TRIG 84 113   < > 105 92   CHOLHDLRATIO  --   --   --  2.9 3.0    < > = values in this interval not displayed.        HYPERTENSION - Patient has longstanding history of HTN , currently denies any symptoms referable to elevated blood pressure. Specifically denies chest pain, palpitations, dyspnea, orthopnea, PND or peripheral edema. Blood pressure readings have been in normal range. Current medication regimen is as listed below. Patient denies any side effects of medication.   BP Readings from Last 6 Encounters:   10/21/19 136/82   10/10/19 139/89   09/10/19 (!) 149/98   08/19/19 (!) 140/80   08/01/19 (!) 161/101   06/21/19 138/84      HYPOTHYROIDISM - Patient has a longstanding history of  chronic Hypothyroidism. Patient has been doing well, noting no tremor, insomnia, hair loss or changes in skin texture. Continues to take medications as directed, without adverse reactions or side effects. Last TSH   Lab Results   Component Value Date    TSH 0.47 07/10/2019   .        MEDICAL HISTORY:     Patient Active Problem List    Diagnosis Date Noted     Bicuspid aortic valve 10/10/2019     Priority: Medium     Palpitations 10/10/2019     Priority: Medium     Need for vaccination 06/23/2019     Priority: Medium     Gastroesophageal reflux disease without esophagitis 06/23/2019     Priority: Medium     Ascending aorta dilatation (H) 06/23/2019     Priority: Medium     Mixed stress and urge urinary incontinence 05/21/2019     Priority: Medium     Irregular heart beat 05/21/2019     Priority: Medium     Overactive bladder 05/21/2019     Priority: Medium     Elevated glucose 05/21/2019     Priority: Medium     Heartburn 05/21/2019     Priority: Medium     Primary osteoarthritis involving multiple joints 05/21/2019     Priority: Medium     Morbid obesity (H) 05/20/2019     Priority: Medium     Chest pain 02/22/2016     Priority: Medium     1/7/2016:Negative Lexiscan stress test done in [place].        Advanced directives, counseling/discussion 09/19/2011     Priority: Medium       Discussed advance care planning with patient; information given to patient to review. 9/19/2011          OA (osteoarthritis) of knee 02/11/2010     Priority: Medium     Hyperlipidemia LDL goal <130 12/29/2006     Priority: Medium     10/13/2006: CHOL 233, HDL 79, , TRIG 80. Trial of diet changes, weight loss and regular exercise.  December 29, 2006: recommend that Audelia have fasting lipid panel done in the next few weeks.  10/23/2007: CHOL      218,HDL       67, LDL      138, TRIG       64 . Started on Simvastatin.       Hypothyroidism 11/12/2006     Priority: Medium     She had Grave's disease.   11/05:s/p radioactive iodine  treatment, followed by [facility] endocrinology Dr. Kathy Yang.  2006: TSH: 1.90   2007: TSH pending, now only sees [facility] endocrinology Dr. Kathy Yang as needed.         Hypertension goal BP (blood pressure) < 140/90 2005     Priority: Medium     -EK/06: EKG sinus bradycardia.2006: EKG  -fasting lipid panel: 10/13/2006: CHOL 233, HDL 79, , TRIG 80. Trial of diet changes, weight loss and      regular exercise.2006: recommend that Audelia have fasting lipid panel done in the next few      Weeks.  -TSH: 2006: TSH: 1.90   -UA: 2006: normal   -chem 8: 2006: normal   2007: recheck blood pressure at work in 1 week and notify me of results.  Dx 1999       Obesity (BMI 30-39.9) 2005     Priority: Medium     2006: Body mass index is 34.78 kg/(m^2). Recommended: diet changes, weight loss and regular exercise.  2007: Body mass index is 35.53 kg/(m^2). recommended the Weight Watchers' program.        Past Medical History:   Diagnosis Date     Abnormal Papanicolaou smear of cervix and cervical HPV      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     Bicuspid aortic valve 10/10/2019     Endometrial polyp 2009     Major depression in complete remission (H) 3/8/2011     Sebaceous cyst 3/26/2010     Thyrotoxicosis without mention of goiter or other cause, without mention of thyrotoxic crisis or storm      Past Surgical History:   Procedure Laterality Date     ARTHROPLASTY TOE(S)  2011    Procedure:ARTHROPLASTY TOE(S); Subtalar implant-K[facility]ner Procedure; Surgeon:LUIS DANIEL GRIDER; Location:[facility] OR     ARTHROSCOPY KNEE RT/LT  2009    left knee meniscal tear     COLONOSCOPY  10/13/06    1 mlouc-qrzwve-swstmc in 10 yrs     COLONOSCOPY N/A 2019    Procedure: COLONOSCOPY;  Surgeon: Rylan Montano MD;  Location: [facility]     GYN SURGERY       LENGTHEN TENDON ACHILLES  2011     Procedure:LENGTHEN TENDON ACHILLES; Tendon transfer-Anes. consult-block       SURGICAL HISTORY OF -   1982    Tubal ligation     SURGICAL HISTORY OF -       uterine polyp removal     SURGICAL HISTORY OF -       left foot surgery for bunionette     Current Outpatient Medications   Medication Sig Dispense Refill     amLODIPine (NORVASC) 10 MG tablet Take 1 tablet (10 mg) by mouth daily 90 tablet 3     aspirin 81 MG tablet Take by mouth daily       calcium 600-200 MG-UNIT TABS Take 1 tablet by mouth daily 90 tablet 1     carvedilol (COREG) 12.5 MG tablet Take 1 tablet (12.5 mg) by mouth 2 times daily (with meals) 180 tablet 3     Cholecalciferol (VITAMIN D3) 2000 UNITS CAPS Take 1 capsule by mouth       Glucosamine HCl 1000 MG TABS Take 1 tablet by mouth 2 times daily.       levothyroxine (SYNTHROID/LEVOTHROID) 175 MCG tablet TAKE ONE TABLET BY MOUTH ONCE DAILY 90 tablet 1     losartan (COZAAR) 100 MG tablet Take 1 tablet (100 mg) by mouth daily 90 tablet 3     lovastatin (MEVACOR) 40 MG tablet TAKE ONE TABLET BY MOUTH AT BEDTIME 90 tablet 3     meloxicam (MOBIC) 15 MG tablet Take 0.5-1 tablets (7.5-15 mg) by mouth daily 90 tablet 3     omeprazole (PRILOSEC) 20 MG DR capsule TAKE ONE CAPSULE BY MOUTH ONCE DAILY 20 - 60 MINUTES BEFORE A MEAL 90 capsule 3     trospium (SANCTURA) 20 MG tablet Take 1 tablet (20 mg) by mouth 2 times daily 180 tablet 1     OTC products: None, except as noted above    Allergies   Allergen Reactions     Lisinopril Cough     Sulfa Drugs Rash      Latex Allergy: NO    Social History     Tobacco Use     Smoking status: Never Smoker     Smokeless tobacco: Never Used   Substance Use Topics     Alcohol use: Yes     Comment: occ     History   Drug Use No       REVIEW OF SYSTEMS:   CONSTITUTIONAL: NEGATIVE for fever, chills, change in weight  ENT/MOUTH: NEGATIVE for ear, mouth and throat problems  RESP: NEGATIVE for significant cough or SOB  CV: NEGATIVE for chest pain, palpitations or peripheral  "edema  GI: NEGATIVE for nausea, abdominal pain, heartburn, or change in bowel habits  : No dysuria, urinary frequency or urgency.     EXAM:   /82 (BP Location: Right arm, Patient Position: Sitting, Cuff Size: Adult Large)   Pulse 73   Temp 97.4  F (36.3  C) (Tympanic)   Ht 1.676 m (5' 6\")   Wt 114.7 kg (252 lb 12.8 oz)   SpO2 96%   BMI 40.80 kg/m    General: appears well, no distress  HEENT: TMs and canals negative bilaterally, oropharynx with no erythema, no exudate  Bilateral ptosis  Neck: supple, no adenopathy, no JVD   Heart: regular rate and rhythm, normal S1S2, 1-2/6 systolic ejection murmur  Lungs: clear to ascultation   Abd: soft, nontender, no HSM, no mass or distention   Ext: no edema     DIAGNOSTICS:   EKG: from 5/2019 appears normal, NSR, normal axis, normal intervals, no acute ST/T changes c/w ischemia, no LVH by voltage criteria, occasional PVC noted, unifocal, unchanged from previous tracings    Recent Labs   Lab Test 07/10/19  0956 05/15/19  0819 02/23/18  0749   HGB  --  14.5 14.1   PLT  --  264 280   NA  --  138 139   POTASSIUM  --  3.6 3.6   CR  --  0.58 0.52   A1C 6.1*  --   --         IMPRESSION:   Reason for surgery/procedure: ptosis of eyelids    The proposed surgical procedure is considered LOW risk.    REVISED CARDIAC RISK INDEX  The patient has the following serious cardiovascular risks for perioperative complications such as (MI, PE, VFib and 3  AV Block):  No serious cardiac risks  INTERPRETATION: 0 risks: Class I (very low risk - 0.4% complication rate)    The patient has the following additional risks for perioperative complications:  No identified additional risks      ICD-10-CM    1. Preop general physical exam Z01.818    2. Ptosis of both upper eyelids H02.403 CBC with platelets     Comprehensive metabolic panel   3. Palpitations R00.2 carvedilol (COREG) 12.5 MG tablet   4. Hypothyroidism, unspecified type E03.9    5. Overactive bladder N32.81    6. Hypertension goal BP " (blood pressure) < 140/90 I10 CBC with platelets     Comprehensive metabolic panel   7. Bicuspid aortic valve Q23.1    8. Ascending aorta dilatation (H) I77.810    9. Hyperlipidemia LDL goal <130 E78.5    10. Gastroesophageal reflux disease without esophagitis K21.9    11. Prediabetes R73.03 Hemoglobin A1c       RECOMMENDATIONS:       --Patient is to take all scheduled medications on the day of surgery EXCEPT for modifications listed below.    Hold vitamins and Mobic, aspirin x 7 days      APPROVAL GIVEN to proceed with proposed procedure, without further diagnostic evaluation       Signed Electronically by: Anu Desai MD    Copy of this evaluation report is provided to requesting physician.    [facility] Preop Guidelines    Revised Cardiac Risk Index

## 2019-10-30 NOTE — ADDENDUM NOTE
Encounter addended by: Jessica Hammonds PT on: 10/30/2019 9:08 AM   Actions taken: Clinical Note Signed, Episode resolved

## 2019-11-23 NOTE — PROGRESS NOTES
*This note serves as the Discharge Note as pt did not return and this is last known status.      Physical Therapy Progress Note and Medicare RECERTIFICATION    Audelia Aceves  1950    Session Number: 3/6 (, [facility]) since start of care.    Reasons for Continuing Treatment:   Pt has not been extremely compliant with her exers, so progress has been slow.  Pt could benefit from ongoing PT to stay on track with exers, and to advance exers. Pt does well with BF training as session progresses, so she will need to cont BF to learn muscle memory.     Frequency/Duration  1 times per every 2 weeks for 6 weeks for a total of 3 visits.    Recertification Period  8/7/19 - 9/17/19    Physician Signature:    Date:    X_______________________________________________________    Physician Name: Anu More MD    I certify the need for these services furnished under this plan of treatment and while under my care. Physician co-signature of this document indicates review and certification of the therapy plan.  This signature may be written on paper, or electronically signed within EPIC.          08/07/19 0900   Signing Clinician's Name / Credentials   Signing clinician's name / credentials Jessica Hammonds, PT MA #[id number]   Session Number   Session Number 3/6 (, [employer])   Authorization status 365; Cert Req'd   Progress Note/Recertification   Progress Note Due Date 08/07/19   Progress Note Completed Date 08/07/19   Recertification Due Date 08/07/19   Ortho Goal 1   Goal Identifier STG   Goal Description 1) Pt will improve PFM control to report ability to make it to bathroom at home with 50% of her urges, in 4 weeks.     Target Date 07/10/19   Ortho Goal 2   Goal Identifier STG   Goal Description 2)Pt will improve her PFM control to report 50% of coughing and sneezing without UI, in 4 weeks.    Target Date 07/10/19   Ortho Goal 3   Goal Identifier LTG   Goal Description 3)Pt will report void times of every 3 hours without Urge  "UI, in 8 weeks.    Target Date 08/07/19   Ortho Goal 4   Goal Identifier LTG   Goal Description 4)Pt will report 5/7 days dry, in 8 weeks.   Target Date 08/07/19   Ortho Goal 5   Goal Identifier LTG   Goal Description 5) Pt will be indep in HEP to prevent return of symptoms in 8 weeks.    Target Date 08/07/19   Subjective Report   Subjective Report Doing better with the OAB meds and \"less side effects\" than last med she was on.  States \"I would be better if I did the exers more often.\"   Pt very busy with heart health \"Stuff\" and volunteering at her [employer].  Still leaking \"often.\"    Objective Measure 1   Objective Measure Leaking   Details Daily, mostly with urges. No comment made about leaking with coughing or sneezing.    Objective Measure 2   Objective Measure Void Times   Details Not assessed today; still getting sudden urges, rd at nite that is not making it before leaking.   Objective Measure 3   Objective Measure Biofeedback   Details Abdominals: good level control, no paradoxic contractions.  PFM: Max = 16.4uV, Avg = 13uV, rest = 1uV and endurance = 3-4 sec   Objective Measure 4   Objective Measure Fluid Intake   Details Water =32 oz/day, Coffee = 2c/am, 1c 3pm.   Biofeedback   Pelvic Floor Muscles (PFM) Biofeedback Minutes (14523) 15   Skilled Intervention PFM BF   Patient Response Improved ability to recruit the muscles - increased by 10uV from last session.  Pt forgot her Probe, so had to use sticky electrodes.    Treatment Detail Pt in supported supine position with perianal and abdominal surface EMG electrodes placed. BF administered using the [employer]20 device and [employer] 3D software. Pt guided through quick and hold contractions using BF to enhance quality and control of muscle contractions.   Therapeutic Procedure/exercise   Therapeutic Procedures: strength, endurance, ROM, flexibillity minutes (98110) 75   Skilled Intervention Exer: strengthening, Isolation/awareness, uptraining; progression " of HEP   Patient Response Pt able to demo exers correctly.  Able to relax on command.    Treatment Detail REviewed quicks and holds, completing 2x10 reps.  Increased holds to 4 seconds and told to do so with HEP.  Heavily reviewed the steps to control the urge.  Gave suggestions of post-it notes around house, in car, etc to remind her to do her exers.  Reviewed roll-ins and instructed in roll-outs; completed 5 reps each alternating.    Plan   Home program Given updated exer sheet on increased hold times, and added roll-outs.  Issued red TB for home.    Plan for next session See pt in 2 weeks. Cont with BF training and advance HEP as needed to progress toward goals.  Plan to see pt up to 3 more times over next 6 weeks as needed to meet goals.    Total Session Time   Timed Code Treatment Minutes 39 (3TE)   Total Treatment Time (sum of timed and untimed services) 54 (BF, 3TE)   Thank you for the referral of this patient.  Jessica Hammonds, PT, MA  #[id number]     Hypertension, unspecified type

## 2019-12-31 ENCOUNTER — TELEPHONE (OUTPATIENT)
Dept: CARDIOLOGY | Facility: CLINIC | Age: 69
End: 2019-12-31

## 2019-12-31 NOTE — TELEPHONE ENCOUNTER
Faxed request from t, stating Nitrostat sublingual 0.4 mg tablet is not on pt's formulary, but nitroglycerin sublingual tablet and solution are. Chart reviewed as Nitrostat is not on pt's med list. Pt had CTA 8/19/19 and it looks like Nitrostat was ordered as part of that order set. The result of the CTA at that time reports a calcium score of 0. Will message to Dr. Benjamin - should Rx for Nitroglycerin sublingual 0.4 mg tab be sent for the pt or not?    Ca Acosta RN on 12/31/2019 at 4:39 PM

## 2020-01-02 NOTE — TELEPHONE ENCOUNTER
Pt called back and she left an alternative phone number to reach her at. Returned the call, spoke w/pt. Pt states she is not taking NTG, and was unsure why her insurance/pharmacy were contacting us about this med as she has not requested any. Closed file, no further action needed. NTG had already been taken off pt's med list. Ca Acosta RN on 1/2/2020 at 3:33 PM

## 2020-01-02 NOTE — TELEPHONE ENCOUNTER
Call to pt to review NTG Rx request, to make sure pt has not had chest pain. She had a Coronary CTA in August 2019 which showed only trivial CAD and a Calcium Score of 0. I did not see that Dr. Benjamin ever prescribed SL NTG, other than that it was administered under his name during the CTA. I messaged Dr. Benjamin to confirm that this can be denied/discontinued. Pt was not home, her  took a message and will have her call back. If she confirms no chest pain and that she has not taken any NTG, will discontinue. Ca Acosta RN on 1/2/2020 at 2:59 PM

## 2020-01-21 ENCOUNTER — TELEPHONE (OUTPATIENT)
Dept: FAMILY MEDICINE | Facility: CLINIC | Age: 70
End: 2020-01-21

## 2020-01-21 DIAGNOSIS — N32.81 OVERACTIVE BLADDER: ICD-10-CM

## 2020-01-21 RX ORDER — TOLTERODINE 4 MG/1
4 CAPSULE, EXTENDED RELEASE ORAL DAILY
Qty: 30 CAPSULE | Refills: 3 | Status: SHIPPED | OUTPATIENT
Start: 2020-01-21

## 2020-01-21 NOTE — TELEPHONE ENCOUNTER
Reason for Call:  Cost of Medication    Detailed comments: Pt says she is on trospium (SANCTURA) 20 MG tablet and it has gotten too expensive for her. 3 Months is now $120. It didn't used to be that much but there were some changes with her insurance for her medication. She wonders if there is something cheaper. If not, She says she just won't take it.   Please let her know if there is a change. She uses [facility] in [place].     Phone Number Patient can be reached at: Home number on file [phone] (home)    Best Time: anytime    Can we leave a detailed message on this number? YES    Call taken on 1/21/2020 at 2:14 PM by Josie Bailey

## 2020-02-04 ENCOUNTER — TELEPHONE (OUTPATIENT)
Dept: FAMILY MEDICINE | Facility: CLINIC | Age: 70
End: 2020-02-04

## 2020-02-04 NOTE — TELEPHONE ENCOUNTER
[facility]- Physicians Report  Form placed on Provider [place] desk for Signature.  Yesenia Orn Station Sec

## 2020-02-06 NOTE — TELEPHONE ENCOUNTER
Pt is requesting to  the Physician form.  Form received back signed  2/5/20   Sent to be scanned to this encounter  2/6/20    [facility] Station [facility]

## 2020-02-08 DIAGNOSIS — E03.9 HYPOTHYROIDISM, UNSPECIFIED TYPE: ICD-10-CM

## 2020-02-08 NOTE — TELEPHONE ENCOUNTER
"Requested Prescriptions   Pending Prescriptions Disp Refills     levothyroxine (SYNTHROID/LEVOTHROID) 175 MCG tablet [Pharmacy Med Name: LEVOTHYROXINE SODIUM 175MCG TABS]  Last Written Prescription Date:  11/10/19  Last Fill Quantity: 90,  # refills: 1   Last office visit: 10/21/2019 with prescribing provider:  ALESIA More   Future Office Visit:     90 tablet 1     Sig: TAKE ONE TABLET BY MOUTH ONCE DAILY       Thyroid Protocol Passed - 2/8/2020 10:01 AM        Passed - Patient is 12 years or older        Passed - Recent (12 mo) or future (30 days) visit within the authorizing provider's specialty     Patient has had an office visit with the authorizing provider or a provider within the authorizing providers department within the previous 12 mos or has a future within next 30 days. See \"Patient Info\" tab in inbasket, or \"Choose Columns\" in Meds & Orders section of the refill encounter.              Passed - Medication is active on med list        Passed - Normal TSH on file in past 12 months     Recent Labs   Lab Test 07/10/19  0956   TSH 0.47              Passed - No active pregnancy on record     If patient is pregnant or has had a positive pregnancy test, please check TSH.          Passed - No positive pregnancy test in past 12 months     If patient is pregnant or has had a positive pregnancy test, please check TSH.            "

## 2020-02-10 RX ORDER — LEVOTHYROXINE SODIUM 175 UG/1
TABLET ORAL
Qty: 90 TABLET | Refills: 1 | Status: SHIPPED | OUTPATIENT
Start: 2020-02-10 | End: 2020-08-06

## 2020-05-22 DIAGNOSIS — E78.5 HYPERLIPIDEMIA LDL GOAL <130: ICD-10-CM

## 2020-05-22 DIAGNOSIS — I10 HYPERTENSION GOAL BP (BLOOD PRESSURE) < 140/90: ICD-10-CM

## 2020-05-22 RX ORDER — AMLODIPINE BESYLATE 10 MG/1
TABLET ORAL
Qty: 90 TABLET | Refills: 0 | Status: SHIPPED | OUTPATIENT
Start: 2020-05-22 | End: 2020-08-06

## 2020-05-22 RX ORDER — LOVASTATIN 40 MG
TABLET ORAL
Qty: 90 TABLET | Refills: 3 | Status: SHIPPED | OUTPATIENT
Start: 2020-05-22 | End: 2020-06-25

## 2020-05-22 NOTE — TELEPHONE ENCOUNTER
"Routing refill request to provider for review/approval because:  Labs not current:  Lipids    Prescription for amlodipine approved per [facility] Refill Protocol.      Requested Prescriptions   Pending Prescriptions Disp Refills     lovastatin (MEVACOR) 40 MG tablet [Pharmacy Med Name: LOVASTATIN 40MG TABS] 90 tablet 3     Sig: TAKE 1 TABLET BY MOUTH AT BEDTIME       Statins Protocol Failed - 5/22/2020  8:13 AM        Failed - LDL on file in past 12 months     Recent Labs   Lab Test 05/15/19  0819   LDL 87             Passed - No abnormal creatine kinase in past 12 months     No lab results found.             Passed - Recent (12 mo) or future (30 days) visit within the authorizing provider's specialty     Patient has had an office visit with the authorizing provider or a provider within the authorizing providers department within the previous 12 mos or has a future within next 30 days. See \"Patient Info\" tab in inbasket, or \"Choose Columns\" in Meds & Orders section of the refill encounter.              Passed - Medication is active on med list        Passed - Patient is age 18 or older        Passed - No active pregnancy on record        Passed - No positive pregnancy test in past 12 months           amLODIPine (NORVASC) 10 MG tablet [Pharmacy Med Name: AMLODIPINE BESYLATE 10MG TABS] 90 tablet 3     Sig: TAKE ONE TABLET BY MOUTH ONCE DAILY       Calcium Channel Blockers Protocol  Passed - 5/22/2020  8:13 AM        Passed - Blood pressure under 140/90 in past 12 months     BP Readings from Last 3 Encounters:   10/21/19 136/82   10/10/19 139/89   09/10/19 (!) 149/98                 Passed - Recent (12 mo) or future (30 days) visit within the authorizing provider's specialty     Patient has had an office visit with the authorizing provider or a provider within the authorizing providers department within the previous 12 mos or has a future within next 30 days. See \"Patient Info\" tab in inbasket, or \"Choose Columns\" in Meds & " Orders section of the refill encounter.              Passed - Medication is active on med list        Passed - Patient is age 18 or older        Passed - No active pregnancy on record        Passed - Normal serum creatinine on file in past 12 months     Recent Labs   Lab Test 10/21/19  1121   CR 0.63       Ok to refill medication if creatinine is low          Passed - No positive pregnancy test in past 12 months           Svitlana SWANSON, RN, BSN

## 2020-06-05 DIAGNOSIS — R12 HEARTBURN: ICD-10-CM

## 2020-06-22 ENCOUNTER — TELEPHONE (OUTPATIENT)
Dept: FAMILY MEDICINE | Facility: CLINIC | Age: 70
End: 2020-06-22

## 2020-06-22 NOTE — TELEPHONE ENCOUNTER
Reason for call:  Patient reporting a symptom    Symptom or request: Pt calling ot see if Dr Desai could see her this week. States she has  Couple spots on her back that are itchy, raised and red. It's bigger than a bug bite. She has had it about a week and has been putting hydrocortisone cream on it.         Have you been treated for this before? No    Additional comments:      Phone Number patient can be reached at:     [phone]  Best Time:  anytime    Can we leave a detailed message on this number:  YES    Call taken on 6/22/2020 at 10:12 AM by Josie Bailey

## 2020-06-25 ENCOUNTER — OFFICE VISIT (OUTPATIENT)
Dept: FAMILY MEDICINE | Facility: CLINIC | Age: 70
End: 2020-06-25
Payer: COMMERCIAL

## 2020-06-25 VITALS
BODY MASS INDEX: 40.98 KG/M2 | TEMPERATURE: 98.1 F | SYSTOLIC BLOOD PRESSURE: 139 MMHG | WEIGHT: 255 LBS | HEIGHT: 66 IN | RESPIRATION RATE: 16 BRPM | DIASTOLIC BLOOD PRESSURE: 68 MMHG | HEART RATE: 64 BPM

## 2020-06-25 DIAGNOSIS — B00.9 HERPES SIMPLEX VIRUS INFECTION: ICD-10-CM

## 2020-06-25 DIAGNOSIS — L08.9 LOCAL INFECTION OF SKIN AND SUBCUTANEOUS TISSUE: Primary | ICD-10-CM

## 2020-06-25 DIAGNOSIS — R73.9 ELEVATED BLOOD SUGAR: ICD-10-CM

## 2020-06-25 DIAGNOSIS — R12 HEARTBURN: ICD-10-CM

## 2020-06-25 DIAGNOSIS — I10 HYPERTENSION GOAL BP (BLOOD PRESSURE) < 140/90: ICD-10-CM

## 2020-06-25 DIAGNOSIS — I77.810 ASCENDING AORTA DILATATION (H): ICD-10-CM

## 2020-06-25 DIAGNOSIS — E78.5 HYPERLIPIDEMIA LDL GOAL <130: ICD-10-CM

## 2020-06-25 DIAGNOSIS — E03.9 HYPOTHYROIDISM, UNSPECIFIED TYPE: ICD-10-CM

## 2020-06-25 LAB
ALBUMIN SERPL-MCNC: 3.4 G/DL (ref 3.4–5)
ALP SERPL-CCNC: 121 U/L (ref 40–150)
ALT SERPL W P-5'-P-CCNC: 24 U/L (ref 0–50)
ANION GAP SERPL CALCULATED.3IONS-SCNC: 2 MMOL/L (ref 3–14)
AST SERPL W P-5'-P-CCNC: 17 U/L (ref 0–45)
BASOPHILS # BLD AUTO: 0.1 10E9/L (ref 0–0.2)
BASOPHILS NFR BLD AUTO: 0.6 %
BILIRUB SERPL-MCNC: 0.3 MG/DL (ref 0.2–1.3)
BUN SERPL-MCNC: 18 MG/DL (ref 7–30)
CALCIUM SERPL-MCNC: 9.6 MG/DL (ref 8.5–10.1)
CHLORIDE SERPL-SCNC: 105 MMOL/L (ref 94–109)
CHOLEST SERPL-MCNC: 187 MG/DL
CO2 SERPL-SCNC: 31 MMOL/L (ref 20–32)
CREAT SERPL-MCNC: 0.59 MG/DL (ref 0.52–1.04)
DIFFERENTIAL METHOD BLD: NORMAL
EOSINOPHIL # BLD AUTO: 0.2 10E9/L (ref 0–0.7)
EOSINOPHIL NFR BLD AUTO: 2.9 %
ERYTHROCYTE [DISTWIDTH] IN BLOOD BY AUTOMATED COUNT: 13.5 % (ref 10–15)
GFR SERPL CREATININE-BSD FRML MDRD: >90 ML/MIN/{1.73_M2}
GLUCOSE SERPL-MCNC: 126 MG/DL (ref 70–99)
HCT VFR BLD AUTO: 43.5 % (ref 35–47)
HDLC SERPL-MCNC: 59 MG/DL
HGB BLD-MCNC: 13.9 G/DL (ref 11.7–15.7)
IMM GRANULOCYTES # BLD: 0.1 10E9/L (ref 0–0.4)
IMM GRANULOCYTES NFR BLD: 0.6 %
LDLC SERPL CALC-MCNC: 104 MG/DL
LYMPHOCYTES # BLD AUTO: 2 10E9/L (ref 0.8–5.3)
LYMPHOCYTES NFR BLD AUTO: 24.8 %
MCH RBC QN AUTO: 28.6 PG (ref 26.5–33)
MCHC RBC AUTO-ENTMCNC: 32 G/DL (ref 31.5–36.5)
MCV RBC AUTO: 90 FL (ref 78–100)
MONOCYTES # BLD AUTO: 0.7 10E9/L (ref 0–1.3)
MONOCYTES NFR BLD AUTO: 7.9 %
NEUTROPHILS # BLD AUTO: 5.2 10E9/L (ref 1.6–8.3)
NEUTROPHILS NFR BLD AUTO: 63.2 %
NONHDLC SERPL-MCNC: 128 MG/DL
NRBC # BLD AUTO: 0 10*3/UL
NRBC BLD AUTO-RTO: 0 /100
PLATELET # BLD AUTO: 259 10E9/L (ref 150–450)
POTASSIUM SERPL-SCNC: 4 MMOL/L (ref 3.4–5.3)
PROT SERPL-MCNC: 7.8 G/DL (ref 6.8–8.8)
RBC # BLD AUTO: 4.86 10E12/L (ref 3.8–5.2)
SODIUM SERPL-SCNC: 138 MMOL/L (ref 133–144)
TRIGL SERPL-MCNC: 119 MG/DL
TSH SERPL DL<=0.005 MIU/L-ACNC: 0.17 MU/L (ref 0.4–4)
WBC # BLD AUTO: 8.2 10E9/L (ref 4–11)

## 2020-06-25 PROCEDURE — 83036 HEMOGLOBIN GLYCOSYLATED A1C: CPT | Performed by: FAMILY MEDICINE

## 2020-06-25 PROCEDURE — 80061 LIPID PANEL: CPT | Performed by: FAMILY MEDICINE

## 2020-06-25 PROCEDURE — 80053 COMPREHEN METABOLIC PANEL: CPT | Performed by: FAMILY MEDICINE

## 2020-06-25 PROCEDURE — 99214 OFFICE O/P EST MOD 30 MIN: CPT | Performed by: FAMILY MEDICINE

## 2020-06-25 PROCEDURE — 84443 ASSAY THYROID STIM HORMONE: CPT | Performed by: FAMILY MEDICINE

## 2020-06-25 PROCEDURE — 36415 COLL VENOUS BLD VENIPUNCTURE: CPT | Performed by: FAMILY MEDICINE

## 2020-06-25 PROCEDURE — 85025 COMPLETE CBC W/AUTO DIFF WBC: CPT | Performed by: FAMILY MEDICINE

## 2020-06-25 RX ORDER — LOVASTATIN 40 MG
TABLET ORAL
Qty: 90 TABLET | Refills: 3 | Status: SHIPPED | OUTPATIENT
Start: 2020-06-25 | End: 2021-08-19

## 2020-06-25 RX ORDER — CEPHALEXIN 500 MG/1
500 CAPSULE ORAL 2 TIMES DAILY
Qty: 14 CAPSULE | Refills: 0 | Status: SHIPPED | OUTPATIENT
Start: 2020-06-25 | End: 2020-07-02

## 2020-06-25 RX ORDER — TRIAMCINOLONE ACETONIDE 1 MG/G
OINTMENT TOPICAL 2 TIMES DAILY
Qty: 30 G | Refills: 1 | Status: SHIPPED | OUTPATIENT
Start: 2020-06-25

## 2020-06-25 ASSESSMENT — MIFFLIN-ST. JEOR: SCORE: 1703.42

## 2020-06-25 NOTE — PROGRESS NOTES
"Subjective     Audelia Aceves is a 70 year old female who presents to clinic today for the following health issues:    HPI   Rash      Duration: 2 weeks    Description  Location: back  Itching: severe    Intensity:  severe    Accompanying signs and symptoms: None    History (similar episodes/previous evaluation): None    Precipitating or alleviating factors:  New exposures:  None  Recent travel: no      Therapies tried and outcome: hydrocortisone cream -  not effective    Very pruritic. Was two spots, now has a few on front.   Gets \"prickly\" and a little sore, but mainly very pruritic.     BP Readings from Last 3 Encounters:   06/25/20 139/68   10/21/19 136/82   10/10/19 139/89    Wt Readings from Last 3 Encounters:   06/25/20 115.7 kg (255 lb)   10/21/19 114.7 kg (252 lb 12.8 oz)   10/10/19 114.8 kg (253 lb)         Hypothyroidism  On replacement   TSH   Date Value Ref Range Status   06/25/2020 0.17 (L) 0.40 - 4.00 mU/L Final        hypertension   On Amlodipine, carvedilol, losartan  BP Readings from Last 6 Encounters:   06/25/20 139/68   10/21/19 136/82   10/10/19 139/89   09/10/19 (!) 149/98   08/19/19 (!) 140/80   08/01/19 (!) 161/101      She is moving to [place], [place] in 2 weeks.     Reflux is well controlled on omeprazole  hyperlipidemia   On statin  Been a years since labs    ascending aorta dilation  Last echocardiogram was a year ago and unchanged, recommendation was to repeat in 2 years      Reviewed and updated as needed this visit by Provider  Tobacco  Allergies  Meds  Problems  Med Hx  Surg Hx  Fam Hx        Recent Labs   Lab Test 05/15/19  0819 02/23/18  0749  08/26/15  0924 08/21/14  0830   CHOL 164 168   < > 183 172   HDL 60 53   < > 63 57   LDL 87 92   < > 99 97   TRIG 84 113   < > 105 92   CHOLHDLRATIO  --   --   --  2.9 3.0    < > = values in this interval not displayed.        Review of Systems   No fever  Some breast discomfort where the new sores are  Rest of 5 point ROS negative " "except as noted above in HPI, including Gen., Resp., CV, GI &  system review.       Objective    /68 (BP Location: Right arm)   Pulse 64   Temp 98.1  F (36.7  C) (Tympanic)   Resp 16   Ht 1.676 m (5' 6\")   Wt 115.7 kg (255 lb)   BMI 41.16 kg/m    Body mass index is 41.16 kg/m .  Physical Exam   General: appears well, in no distress   Neck: supple, no adenopathy, no JVD  Heart: regular rate and rhythm, normal S1S2, 2/6 systolic ejection murmur   Lungs: clear to ascultation   Skin: on back there are two groups of clustered blisters on erythema that are now in the healing phase with scabs, there are two lesions on front with erythematous macule and papules in center    Diagnostic Test Results:  Labs reviewed in Epic        Assessment & Plan     1. Local infection of skin and subcutaneous tissue  Looks suprainfection  - cephALEXin (KEFLEX) 500 MG capsule; Take 1 capsule (500 mg) by mouth 2 times daily for 7 days  Dispense: 14 capsule; Refill: 0  - triamcinolone (KENALOG) 0.1 % external ointment; Apply topically 2 times daily To back lesions for pruritis  Dispense: 30 g; Refill: 1    2. Herpes simplex virus infection  Likely  Looks classic, I attempted to take pictures but Epic went down   Too long in to course to take antivirals    3. Heartburn  Long standing and well controlled  - omeprazole (PRILOSEC) 20 MG DR capsule; TAKE 1 CAPSULE BY MOUTH ONCE DAILY 20-60 MINUTES BEFORE A MEAL  Dispense: 90 capsule; Refill: 3    4. Hyperlipidemia LDL goal <130  On statin  - lovastatin (MEVACOR) 40 MG tablet; TAKE 1 TABLET BY MOUTH AT BEDTIME  Dispense: 90 tablet; Refill: 3  - Lipid panel reflex to direct LDL Non-fasting    5. Hypertension goal BP (blood pressure) < 140/90  Fair control  - Comprehensive metabolic panel  - CBC with platelets and differential    6. Hypothyroidism, unspecified type  On replacement  - TSH     BMI:   Estimated body mass index is 41.16 kg/m  as calculated from the following:    Height as of " "this encounter: 1.676 m (5' 6\").    Weight as of this encounter: 115.7 kg (255 lb).   Weight management plan: Discussed healthy diet and exercise guidelines    Patient Instructions   Next year you are due for your echocardiogram     Use the antibiotic, benadryl for the itching    Can use a stronger steroid cream to help with the itching       Return if symptoms worsen or fail to improve.    Anu Desai MD  [facility]      "

## 2020-06-25 NOTE — NURSING NOTE
"Chief Complaint   Patient presents with     Derm Problem     2 weeks       Initial BP (!) 158/90 (BP Location: Right arm)   Pulse 64   Temp 98.1  F (36.7  C) (Tympanic)   Resp 16   Ht 1.676 m (5' 6\")   Wt 115.7 kg (255 lb)   BMI 41.16 kg/m   Estimated body mass index is 41.16 kg/m  as calculated from the following:    Height as of this encounter: 1.676 m (5' 6\").    Weight as of this encounter: 115.7 kg (255 lb).    Patient presents to the clinic using No DME    Health Maintenance that is potentially due pending provider review:  NONE    n/a    Is there anyone who you would like to be able to receive your results? No  If yes have patient fill out LEANA    "

## 2020-06-25 NOTE — PATIENT INSTRUCTIONS
Next year you are due for your echocardiogram     Use the antibiotic, benadryl for the itching    Can use a stronger steroid cream to help with the itching

## 2020-06-26 LAB — HBA1C MFR BLD: 6.2 % (ref 0–5.6)

## 2020-07-01 DIAGNOSIS — I10 HYPERTENSION GOAL BP (BLOOD PRESSURE) < 140/90: ICD-10-CM

## 2020-07-02 RX ORDER — LOSARTAN POTASSIUM 100 MG/1
TABLET ORAL
Qty: 90 TABLET | Refills: 3 | Status: SHIPPED | OUTPATIENT
Start: 2020-07-02 | End: 2021-07-05

## 2020-08-06 DIAGNOSIS — E03.9 HYPOTHYROIDISM, UNSPECIFIED TYPE: ICD-10-CM

## 2020-08-06 DIAGNOSIS — I10 HYPERTENSION GOAL BP (BLOOD PRESSURE) < 140/90: ICD-10-CM

## 2020-08-06 RX ORDER — LEVOTHYROXINE SODIUM 175 UG/1
175 TABLET ORAL DAILY
Qty: 90 TABLET | Refills: 1 | Status: SHIPPED | OUTPATIENT
Start: 2020-08-06

## 2020-08-06 RX ORDER — AMLODIPINE BESYLATE 10 MG/1
10 TABLET ORAL DAILY
Qty: 90 TABLET | Refills: 0 | Status: SHIPPED | OUTPATIENT
Start: 2020-08-06 | End: 2020-11-25

## 2020-08-06 NOTE — TELEPHONE ENCOUNTER
"Routing refill request to provider for review/approval because:  Advise on what you would like done for thyroid. 6/25/2020 lab result note: 'The only thing was your thyroid test showed a little too much thyroid medication on board. I recommend one day a week you take 1/2 of your pill, the rest of the days one a day.   You'll need to have it tested again in a few months'  Also, current order on med list reads: levothyroxine (SYNTHROID/LEVOTHROID) 175 MCG tablet Sig: TAKE ONE TABLET BY MOUTH ONCE DAILY     Requested Prescriptions   Pending Prescriptions Disp Refills     amLODIPine (NORVASC) 10 MG tablet 90 tablet 0     Sig: Take 1 tablet (10 mg) by mouth daily       Calcium Channel Blockers Protocol  Passed - 8/6/2020 12:41 PM        Passed - Blood pressure under 140/90 in past 12 months     BP Readings from Last 3 Encounters:   06/25/20 139/68   10/21/19 136/82   10/10/19 139/89                 Passed - Recent (12 mo) or future (30 days) visit within the authorizing provider's specialty     Patient has had an office visit with the authorizing provider or a provider within the authorizing providers department within the previous 12 mos or has a future within next 30 days. See \"Patient Info\" tab in inbasket, or \"Choose Columns\" in Meds & Orders section of the refill encounter.              Passed - Medication is active on med list        Passed - Patient is age 18 or older        Passed - No active pregnancy on record        Passed - Normal serum creatinine on file in past 12 months     Recent Labs   Lab Test 06/25/20  0959   CR 0.59       Ok to refill medication if creatinine is low          Passed - No positive pregnancy test in past 12 months           levothyroxine (SYNTHROID/LEVOTHROID) 175 MCG tablet 90 tablet 1     Sig: Take 1 tablet (175 mcg) by mouth daily       Thyroid Protocol Failed - 8/6/2020 12:41 PM        Failed - Normal TSH on file in past 12 months     Recent Labs   Lab Test 06/25/20  0959   TSH 0.17* " "             Passed - Patient is 12 years or older        Passed - Recent (12 mo) or future (30 days) visit within the authorizing provider's specialty     Patient has had an office visit with the authorizing provider or a provider within the authorizing providers department within the previous 12 mos or has a future within next 30 days. See \"Patient Info\" tab in inbasket, or \"Choose Columns\" in Meds & Orders section of the refill encounter.              Passed - Medication is active on med list        Passed - No active pregnancy on record     If patient is pregnant or has had a positive pregnancy test, please check TSH.          Passed - No positive pregnancy test in past 12 months     If patient is pregnant or has had a positive pregnancy test, please check TSH.             Svitlana SWANSON RN, BSN        "

## 2020-11-09 DIAGNOSIS — M15.0 PRIMARY OSTEOARTHRITIS INVOLVING MULTIPLE JOINTS: ICD-10-CM

## 2020-11-09 RX ORDER — MELOXICAM 15 MG/1
TABLET ORAL
Qty: 90 TABLET | Refills: 0 | Status: SHIPPED | OUTPATIENT
Start: 2020-11-09

## 2020-11-10 RX ORDER — MELOXICAM 15 MG/1
TABLET ORAL
Qty: 90 TABLET | Refills: 0 | OUTPATIENT
Start: 2020-11-10

## 2020-11-24 DIAGNOSIS — I10 HYPERTENSION GOAL BP (BLOOD PRESSURE) < 140/90: ICD-10-CM

## 2020-11-25 RX ORDER — AMLODIPINE BESYLATE 10 MG/1
10 TABLET ORAL DAILY
Qty: 90 TABLET | Refills: 0 | Status: SHIPPED | OUTPATIENT
Start: 2020-11-25

## 2021-01-05 RX ORDER — TROSPIUM CHLORIDE 20 MG/1
TABLET, FILM COATED ORAL
Qty: 60 TABLET | Refills: 5 | OUTPATIENT
Start: 2021-01-05

## 2021-01-14 ENCOUNTER — HEALTH MAINTENANCE LETTER (OUTPATIENT)
Age: 71
End: 2021-01-14

## 2021-02-02 RX ORDER — TROSPIUM CHLORIDE 20 MG/1
TABLET, FILM COATED ORAL
Qty: 60 TABLET | Refills: 5 | OUTPATIENT
Start: 2021-02-02

## 2021-02-02 NOTE — TELEPHONE ENCOUNTER
I talked with Audelia and she not take the [place].  She is seeing another provider as well.  Lorin Phelan RN

## 2021-03-14 DIAGNOSIS — R00.2 PALPITATIONS: ICD-10-CM

## 2021-03-15 RX ORDER — CARVEDILOL 12.5 MG/1
TABLET ORAL
Qty: 180 TABLET | Refills: 0 | Status: SHIPPED | OUTPATIENT
Start: 2021-03-15 | End: 2021-04-06

## 2021-03-30 RX ORDER — TROSPIUM CHLORIDE 20 MG/1
TABLET, FILM COATED ORAL
Qty: 60 TABLET | Refills: 5 | OUTPATIENT
Start: 2021-03-30

## 2021-04-02 DIAGNOSIS — R00.2 PALPITATIONS: ICD-10-CM

## 2021-04-05 RX ORDER — CARVEDILOL 12.5 MG/1
TABLET ORAL
Qty: 180 TABLET | Refills: 0 | OUTPATIENT
Start: 2021-04-05

## 2021-04-05 NOTE — TELEPHONE ENCOUNTER
Pt Established Care with Ivan, Elli FIERRO MD at [facility] in [place] on 1/7/21.    Svitlana SWANSON RN, BSN

## 2021-04-06 DIAGNOSIS — R00.2 PALPITATIONS: ICD-10-CM

## 2021-04-06 RX ORDER — CARVEDILOL 12.5 MG/1
TABLET ORAL
Qty: 180 TABLET | Refills: 0 | Status: SHIPPED | OUTPATIENT
Start: 2021-04-06 | End: 2021-08-02

## 2021-04-08 DIAGNOSIS — N32.81 OVERACTIVE BLADDER: Primary | ICD-10-CM

## 2021-04-13 RX ORDER — TROSPIUM CHLORIDE 20 MG/1
TABLET, FILM COATED ORAL
Qty: 0.01 TABLET | Refills: 0 | Status: SHIPPED | OUTPATIENT
Start: 2021-04-13

## 2021-06-01 DIAGNOSIS — N32.81 OVERACTIVE BLADDER: ICD-10-CM

## 2021-06-01 RX ORDER — TROSPIUM CHLORIDE 20 MG/1
TABLET, FILM COATED ORAL
Qty: 0.01 TABLET | Refills: 0 | Status: CANCELLED | OUTPATIENT
Start: 2021-06-01

## 2021-06-01 NOTE — TELEPHONE ENCOUNTER
Left message to call back. Is she taking this medication or Detrol? Looks like this was discontinued and detrol was prescribed please ask her what med she is taking

## 2021-06-01 NOTE — TELEPHONE ENCOUNTER
Disregard  as pt lives in [place]. Pt already received the medication by her new Family Provider.  Yesenia [place] Sec

## 2021-06-03 DIAGNOSIS — N32.81 OVERACTIVE BLADDER: ICD-10-CM

## 2021-06-03 RX ORDER — TROSPIUM CHLORIDE 20 MG/1
TABLET, FILM COATED ORAL
Status: CANCELLED | OUTPATIENT
Start: 2021-06-03

## 2021-06-03 NOTE — TELEPHONE ENCOUNTER
Spoke with Audelia, she no longer comes to this clinic and will notify the pharmacy to stop sending refill requests here.

## 2021-07-04 DIAGNOSIS — I10 HYPERTENSION GOAL BP (BLOOD PRESSURE) < 140/90: ICD-10-CM

## 2021-07-05 RX ORDER — LOSARTAN POTASSIUM 100 MG/1
TABLET ORAL
Qty: 90 TABLET | Refills: 2 | Status: SHIPPED | OUTPATIENT
Start: 2021-07-05

## 2021-07-05 NOTE — TELEPHONE ENCOUNTER
Routing refill request to provider for review/approval because:  Patient needs to be seen because it has been more than 1 year since last office visit.    Kenrick Foster RN

## 2021-07-15 NOTE — TELEPHONE ENCOUNTER
Assessment/Plan:    Dyslipidemia (high LDL; low HDL)  Likely due to projection therapy  She will discussed with her endocrinologist regarding continuing projection therapy  If decision is to continue, will start statin therapy  Thyroid nodule  Asymptomatic  Thyroid levels are within acceptable range  Gastroesophageal reflux disease without esophagitis  Stable  Continue over-the-counter antacids as needed  BMI 30 0-30 9,adult  Discussed lifestyle modification with diet, exercise, weight loss  Diagnoses and all orders for this visit:    Encounter for screening mammogram for malignant neoplasm of breast  -     Mammo screening bilateral w cad; Future    Hypertension, unspecified type  -     metoprolol succinate (TOPROL-XL) 50 mg 24 hr tablet; Take 1 tablet daily    Benign essential hypertension  -     lisinopril (ZESTRIL) 40 mg tablet; Take 1 tablet (40 mg total) by mouth daily  -     hydrochlorothiazide (HYDRODIURIL) 25 mg tablet; Take 1 tablet (25 mg total) by mouth daily    Dyslipidemia (high LDL; low HDL)  -     Lipid panel; Future    Thyroid nodule    Gastroesophageal reflux disease without esophagitis    BMI 30 0-30 9,adult          BMI Counseling: Body mass index is 27 08 kg/m²  The BMI is above normal  Nutrition recommendations include decreasing portion sizes, encouraging healthy choices of fruits and vegetables, decreasing fast food intake, consuming healthier snacks, limiting drinks that contain sugar, moderation in carbohydrate intake, increasing intake of lean protein, reducing intake of saturated and trans fat and reducing intake of cholesterol  Exercise recommendations include moderate physical activity 150 minutes/week and exercising 3-5 times per week  No pharmacotherapy was ordered  Patient referred to PCP due to patient being overweight  Depression Screening and Follow-up Plan: Patient's depression screening was positive with a PHQ-2 score of 0   Clincally patient does not Levothyroxine     Last Written Prescription Date: 12/23/16  Last Quantity: 90, # refills: 0  Last Office Visit with G, P or [facility] prescribing provider: 12/23/16        TSH   Date Value Ref Range Status   03/24/2017 2.34 0.40 - 4.00 mU/L Final        have depression  No treatment is required  Patient advised to follow-up with PCP for further management  Subjective:      Patient ID: Gricelda Charles is a 48 y o  female  Chief Complaint   Patient presents with    Follow-up     6m follow up  Review labs   Health maintenance     pt will call and schedule mammogram   BMI f/u due        46-year-old female seen today for follow-up of chronic conditions  Laboratory studies reviewed today, CBC and CMP are within acceptable range  A1c is 5 3% and TSH with thyroid hormones are within acceptable range  Her lipid panel it increased significantly when compared to last year  She has not made any dietary changes however she was started on progesterone for menopausal symptoms  Otherwise, she has no active complaints or concerns at this time  She has been compliant with medication regimen  Hypertension  This is a chronic problem  The current episode started more than 1 year ago  The problem is unchanged  The problem is controlled  Pertinent negatives include no chest pain, headaches, palpitations or shortness of breath  Past treatments include ACE inhibitors, beta blockers and diuretics  The current treatment provides moderate improvement  There are no compliance problems  Hyperlipidemia  This is a new problem  The problem is uncontrolled  Recent lipid tests were reviewed and are high  Exacerbated by: Progesterone  Pertinent negatives include no chest pain or shortness of breath  The following portions of the patient's history were reviewed and updated as appropriate: allergies, current medications, past family history, past medical history, past social history, past surgical history and problem list     Review of Systems   Constitutional: Negative for activity change, appetite change, chills, diaphoresis, fatigue and fever  HENT: Negative for congestion, postnasal drip, rhinorrhea, sinus pressure, sinus pain, sneezing and sore throat      Eyes: Negative for visual disturbance  Respiratory: Negative for apnea, cough, choking, chest tightness, shortness of breath and wheezing  Cardiovascular: Negative for chest pain, palpitations and leg swelling  Gastrointestinal: Negative for abdominal distention, abdominal pain, anal bleeding, blood in stool, constipation, diarrhea, nausea and vomiting  Endocrine: Negative for cold intolerance and heat intolerance  Genitourinary: Negative for difficulty urinating, dysuria and hematuria  Musculoskeletal: Negative  Skin: Negative  Neurological: Negative for dizziness, weakness, light-headedness, numbness and headaches  Hematological: Negative for adenopathy  Psychiatric/Behavioral: Negative for agitation, sleep disturbance and suicidal ideas  All other systems reviewed and are negative          Past Medical History:   Diagnosis Date    Benign neoplasm of parotid gland     left    Flexural eczema 2020    Hypertension     Urticaria 3/20/2019         Current Outpatient Medications:     Ascorbic Acid 100 MG CHEW, Chew 1,000 mg daily , Disp: , Rfl:     Cholecalciferol 75 MCG (3000 UT) TABS, Take 2,500 Units by mouth daily, Disp: , Rfl:     ferrous sulfate 325 (65 Fe) mg tablet, Take 325 mg by mouth, Disp: , Rfl:     hydrochlorothiazide (HYDRODIURIL) 25 mg tablet, Take 1 tablet (25 mg total) by mouth daily, Disp: 90 tablet, Rfl: 1    lisinopril (ZESTRIL) 40 mg tablet, Take 1 tablet (40 mg total) by mouth daily, Disp: 90 tablet, Rfl: 1    metoprolol succinate (TOPROL-XL) 50 mg 24 hr tablet, Take 1 tablet daily, Disp: 90 tablet, Rfl: 1    NON FORMULARY, adrenevive, Disp: , Rfl:     progesterone 50 mg/mL injection, progesterone 50 mg capsule  TAKE ONE CAPSULE BY MOUTH ONCE DAILY, Disp: , Rfl:     Allergies   Allergen Reactions    Amlodipine Edema and Swelling    Other      seasonal       Social History   Past Surgical History:   Procedure Laterality Date     SECTION      EAR SURGERY      SALIVARY GLAND SURGERY      TONSILECTOMY AND ADNOIDECTOMY       Family History   Problem Relation Age of Onset    Coronary artery disease Mother         age 52    Coronary artery disease Father         age 61    Breast cancer Sister         age 52   Ansley Day No Known Problems Daughter     No Known Problems Maternal Grandmother     No Known Problems Maternal Grandfather     No Known Problems Paternal Grandmother     No Known Problems Paternal Grandfather     No Known Problems Son     No Known Problems Maternal Aunt     No Known Problems Paternal Aunt     No Known Problems Paternal Aunt        Objective:  /78 (BP Location: Left arm, Patient Position: Sitting, Cuff Size: Adult)   Pulse 65   Temp 98 °F (36 7 °C) (Tympanic)   Ht 5' 7 25" (1 708 m)   Wt 79 kg (174 lb 3 2 oz)   SpO2 95% Comment: ra  BMI 27 08 kg/m²     Recent Results (from the past 1344 hour(s))   Lipid panel    Collection Time: 07/10/21 10:47 AM   Result Value Ref Range    Total Cholesterol 282 (H) <200 mg/dL    HDL 41 (L) > OR = 50 mg/dL    Triglycerides 274 (H) <150 mg/dL    LDL Calculated 192 (H) mg/dL (calc)    Chol HDLC Ratio 6 9 (H) <5 0 (calc)    Non-HDL Cholesterol 241 (H) <130 mg/dL (calc)   Comprehensive metabolic panel    Collection Time: 07/10/21 10:47 AM   Result Value Ref Range    Glucose, Random 101 (H) 65 - 99 mg/dL    BUN 14 7 - 25 mg/dL    Creatinine 0 79 0 50 - 1 05 mg/dL    eGFR Non  86 > OR = 60 mL/min/1 73m2    eGFR  100 > OR = 60 mL/min/1 73m2    SL AMB BUN/CREATININE RATIO NOT APPLICABLE 6 - 22 (calc)    Sodium 137 135 - 146 mmol/L    Potassium 3 8 3 5 - 5 3 mmol/L    Chloride 98 98 - 110 mmol/L    CO2 31 20 - 32 mmol/L    Calcium 10 1 8 6 - 10 4 mg/dL    Protein, Total 7 4 6 1 - 8 1 g/dL    Albumin 4 8 3 6 - 5 1 g/dL    Globulin 2 6 1 9 - 3 7 g/dL (calc)    Albumin/Globulin Ratio 1 8 1 0 - 2 5 (calc)    TOTAL BILIRUBIN 0 6 0 2 - 1 2 mg/dL    Alkaline Phosphatase 50 37 - 153 U/L    AST 15 10 - 35 U/L    ALT 13 6 - 29 U/L   CBC and differential    Collection Time: 07/10/21 10:47 AM   Result Value Ref Range    White Blood Cell Count 7 1 3 8 - 10 8 Thousand/uL    Red Blood Cell Count 4 85 3 80 - 5 10 Million/uL    Hemoglobin 15 5 11 7 - 15 5 g/dL    HCT 45 7 (H) 35 0 - 45 0 %    MCV 94 2 80 0 - 100 0 fL    MCH 32 0 27 0 - 33 0 pg    MCHC 33 9 32 0 - 36 0 g/dL    RDW 12 3 11 0 - 15 0 %    Platelet Count 840 888 - 400 Thousand/uL    SL AMB MPV 10 0 7 5 - 12 5 fL    Neutrophils (Absolute) 4,026 1,500 - 7,800 cells/uL    Lymphocytes (Absolute) 2,286 850 - 3,900 cells/uL    Monocytes (Absolute) 611 200 - 950 cells/uL    Eosinophils (Absolute) 121 15 - 500 cells/uL    Basophils ABS 57 0 - 200 cells/uL    Neutrophils 56 7 %    Lymphocytes 32 2 %    Monocytes 8 6 %    Eosinophils 1 7 %    Basophils PCT 0 8 %    Always Message              Physical Exam  Vitals and nursing note reviewed  Constitutional:       General: She is not in acute distress  Appearance: She is well-developed  She is not diaphoretic  HENT:      Head: Normocephalic and atraumatic  Eyes:      General:         Right eye: No discharge  Left eye: No discharge  Conjunctiva/sclera: Conjunctivae normal       Pupils: Pupils are equal, round, and reactive to light  Neck:      Thyroid: No thyromegaly  Vascular: No JVD  Cardiovascular:      Rate and Rhythm: Normal rate and regular rhythm  Heart sounds: Normal heart sounds  No murmur heard  No friction rub  No gallop  Pulmonary:      Effort: Pulmonary effort is normal  No respiratory distress  Breath sounds: Normal breath sounds  No wheezing or rales  Chest:      Chest wall: No tenderness  Abdominal:      General: There is no distension  Palpations: Abdomen is soft  Tenderness: There is no abdominal tenderness  Musculoskeletal:         General: No tenderness or deformity  Normal range of motion        Cervical back: Normal range of motion and neck supple  Lymphadenopathy:      Cervical: No cervical adenopathy  Skin:     General: Skin is warm and dry  Coloration: Skin is not pale  Findings: No erythema or rash  Neurological:      Mental Status: She is alert and oriented to person, place, and time  Cranial Nerves: No cranial nerve deficit  Coordination: Coordination normal    Psychiatric:         Behavior: Behavior normal          Thought Content:  Thought content normal          Judgment: Judgment normal

## 2021-08-02 DIAGNOSIS — R00.2 PALPITATIONS: ICD-10-CM

## 2021-08-09 RX ORDER — CARVEDILOL 12.5 MG/1
12.5 TABLET ORAL 2 TIMES DAILY WITH MEALS
Qty: 60 TABLET | Refills: 0 | Status: SHIPPED | OUTPATIENT
Start: 2021-08-09 | End: 2021-10-21

## 2021-08-17 DIAGNOSIS — E78.5 HYPERLIPIDEMIA LDL GOAL <130: ICD-10-CM

## 2021-08-19 RX ORDER — LOVASTATIN 40 MG
TABLET ORAL
Qty: 30 TABLET | Refills: 0 | Status: SHIPPED | OUTPATIENT
Start: 2021-08-19 | End: 2021-09-15

## 2021-09-12 DIAGNOSIS — E78.5 HYPERLIPIDEMIA LDL GOAL <130: ICD-10-CM

## 2021-09-15 RX ORDER — LOVASTATIN 40 MG
TABLET ORAL
Qty: 30 TABLET | Refills: 0 | Status: SHIPPED | OUTPATIENT
Start: 2021-09-15 | End: 2021-10-21

## 2021-10-08 DIAGNOSIS — R00.2 PALPITATIONS: ICD-10-CM

## 2021-10-08 DIAGNOSIS — E78.5 HYPERLIPIDEMIA LDL GOAL <130: ICD-10-CM

## 2021-10-11 RX ORDER — LOVASTATIN 40 MG
TABLET ORAL
Qty: 30 TABLET | Refills: 0 | OUTPATIENT
Start: 2021-10-11

## 2021-10-11 RX ORDER — CARVEDILOL 12.5 MG/1
TABLET ORAL
Qty: 60 TABLET | Refills: 0 | OUTPATIENT
Start: 2021-10-11

## 2021-10-19 DIAGNOSIS — R00.2 PALPITATIONS: ICD-10-CM

## 2021-10-19 DIAGNOSIS — E78.5 HYPERLIPIDEMIA LDL GOAL <130: ICD-10-CM

## 2021-10-21 RX ORDER — LOVASTATIN 40 MG
TABLET ORAL
Qty: 0.01 TABLET | Refills: 0 | Status: SHIPPED | OUTPATIENT
Start: 2021-10-21

## 2021-10-21 RX ORDER — CARVEDILOL 12.5 MG/1
TABLET ORAL
Qty: 0.01 TABLET | Refills: 0 | Status: SHIPPED | OUTPATIENT
Start: 2021-10-21

## 2021-10-24 ENCOUNTER — HEALTH MAINTENANCE LETTER (OUTPATIENT)
Age: 71
End: 2021-10-24

## 2021-11-11 NOTE — TELEPHONE ENCOUNTER
"Requested Prescriptions   Pending Prescriptions Disp Refills     levothyroxine (SYNTHROID/LEVOTHROID) 150 MCG tablet [Pharmacy Med Name: LEVOTHYROXINE SODIUM 150MCG TABS] 90 tablet 2     Sig: TAKE ONE TABLET BY MOUTH ONCE DAILY    Thyroid Protocol Passed    3/21/2018  9:42 PM       Passed - Patient is 12 years or older       Passed - Recent (12 mo) or future (30 days) visit within the authorizing provider's specialty    Patient had office visit in the last 12 months or has a visit in the next 30 days with authorizing provider or within the authorizing provider's specialty.  See \"Patient Info\" tab in inbasket, or \"Choose Columns\" in Meds & Orders section of the refill encounter.           Passed - Normal TSH on file in past 12 months    Recent Labs   Lab Test  03/24/17   0823   TSH  2.34             Passed - No active pregnancy on record    If patient is pregnant or has had a positive pregnancy test, please check TSH.         Passed - No positive pregnancy test in past 12 months    If patient is pregnant or has had a positive pregnancy test, please check TSH.          Last Written Prescription Date:  6/23/17  Last Fill Quantity: 90,  # refills: 2   Last office visit: 2/28/2018 with prescribing provider:     Future Office Visit:   Next 5 appointments (look out 90 days)     Mar 22, 2018 11:00 AM CDT   Nurse Only with [place] YESENIA RN   [facility]    [address] [place] [id number]   [phone]                   "
TSH   Date Value Ref Range Status   03/24/2017 2.34 0.40 - 4.00 mU/L Final   12/16/2016 4.41 (H) 0.40 - 4.00 mU/L Final   08/26/2015 3.51 0.40 - 4.00 mU/L Final   08/21/2014 2.22 0.40 - 4.00 mU/L Final     Comment:     Effective 7/30/2014, the reference range for this assay has changed to reflect   new instrumentation/methodology.     07/11/2013 0.64 0.4 - 5.0 mU/L Final     Informed pt due for TSH, lab only appt scheduled when comes in for BP recheck 04-10-18.  Refilled x1 month.  HEBERT Izquierdo RN    
ACP
ACP

## 2022-02-13 ENCOUNTER — HEALTH MAINTENANCE LETTER (OUTPATIENT)
Age: 72
End: 2022-02-13

## 2022-10-15 ENCOUNTER — HEALTH MAINTENANCE LETTER (OUTPATIENT)
Age: 72
End: 2022-10-15

## 2023-03-26 ENCOUNTER — HEALTH MAINTENANCE LETTER (OUTPATIENT)
Age: 73
End: 2023-03-26

## 2023-06-01 ENCOUNTER — HEALTH MAINTENANCE LETTER (OUTPATIENT)
Age: 73
End: 2023-06-01

## 2024-01-21 NOTE — TELEPHONE ENCOUNTER
Medication is being filled for 1 time refill only due to:  Patient needs to be seen because it has been more than one year since last visit.    
no

## 2024-05-26 ENCOUNTER — HEALTH MAINTENANCE LETTER (OUTPATIENT)
Age: 74
End: 2024-05-26